# Patient Record
Sex: MALE | Race: WHITE | NOT HISPANIC OR LATINO | Employment: OTHER | URBAN - METROPOLITAN AREA
[De-identification: names, ages, dates, MRNs, and addresses within clinical notes are randomized per-mention and may not be internally consistent; named-entity substitution may affect disease eponyms.]

---

## 2019-08-20 ENCOUNTER — TRANSCRIBE ORDERS (OUTPATIENT)
Dept: LAB | Facility: MEDICAL CENTER | Age: 59
End: 2019-08-20

## 2019-08-20 ENCOUNTER — APPOINTMENT (OUTPATIENT)
Dept: LAB | Facility: MEDICAL CENTER | Age: 59
End: 2019-08-20
Payer: COMMERCIAL

## 2019-08-20 DIAGNOSIS — E78.5 HYPERLIPIDEMIA, UNSPECIFIED HYPERLIPIDEMIA TYPE: ICD-10-CM

## 2019-08-20 DIAGNOSIS — M25.50 ARTHRALGIA, UNSPECIFIED JOINT: ICD-10-CM

## 2019-08-20 DIAGNOSIS — Z12.5 SCREENING PSA (PROSTATE SPECIFIC ANTIGEN): ICD-10-CM

## 2019-08-20 DIAGNOSIS — M19.90 OSTEOARTHRITIS, UNSPECIFIED OSTEOARTHRITIS TYPE, UNSPECIFIED SITE: ICD-10-CM

## 2019-08-20 DIAGNOSIS — I10 HYPERTENSION, UNSPECIFIED TYPE: ICD-10-CM

## 2019-08-20 DIAGNOSIS — K22.719 BARRETT'S ESOPHAGUS WITH DYSPLASIA: Primary | ICD-10-CM

## 2019-08-20 DIAGNOSIS — K22.719 BARRETT'S ESOPHAGUS WITH DYSPLASIA: ICD-10-CM

## 2019-08-20 LAB
25(OH)D3 SERPL-MCNC: 20.4 NG/ML (ref 30–100)
ALBUMIN SERPL BCP-MCNC: 4.7 G/DL (ref 3.5–5)
ALP SERPL-CCNC: 59 U/L (ref 46–116)
ALT SERPL W P-5'-P-CCNC: 60 U/L (ref 12–78)
ANION GAP SERPL CALCULATED.3IONS-SCNC: 8 MMOL/L (ref 4–13)
AST SERPL W P-5'-P-CCNC: 31 U/L (ref 5–45)
BILIRUB SERPL-MCNC: 0.53 MG/DL (ref 0.2–1)
BUN SERPL-MCNC: 24 MG/DL (ref 5–25)
CALCIUM SERPL-MCNC: 9.4 MG/DL (ref 8.3–10.1)
CHLORIDE SERPL-SCNC: 105 MMOL/L (ref 100–108)
CHOLEST SERPL-MCNC: 234 MG/DL (ref 50–200)
CO2 SERPL-SCNC: 27 MMOL/L (ref 21–32)
CREAT SERPL-MCNC: 1.09 MG/DL (ref 0.6–1.3)
ERYTHROCYTE [DISTWIDTH] IN BLOOD BY AUTOMATED COUNT: 12.1 % (ref 11.6–15.1)
GFR SERPL CREATININE-BSD FRML MDRD: 74 ML/MIN/1.73SQ M
GLUCOSE SERPL-MCNC: 112 MG/DL (ref 65–140)
HCT VFR BLD AUTO: 45.3 % (ref 36.5–49.3)
HDLC SERPL-MCNC: 50 MG/DL (ref 40–60)
HGB BLD-MCNC: 15.6 G/DL (ref 12–17)
MCH RBC QN AUTO: 33.1 PG (ref 26.8–34.3)
MCHC RBC AUTO-ENTMCNC: 34.4 G/DL (ref 31.4–37.4)
MCV RBC AUTO: 96 FL (ref 82–98)
NONHDLC SERPL-MCNC: 184 MG/DL
PLATELET # BLD AUTO: 183 THOUSANDS/UL (ref 149–390)
PMV BLD AUTO: 11 FL (ref 8.9–12.7)
POTASSIUM SERPL-SCNC: 4.1 MMOL/L (ref 3.5–5.3)
PROT SERPL-MCNC: 7.9 G/DL (ref 6.4–8.2)
PSA SERPL-MCNC: 0.4 NG/ML (ref 0–4)
RBC # BLD AUTO: 4.72 MILLION/UL (ref 3.88–5.62)
SODIUM SERPL-SCNC: 140 MMOL/L (ref 136–145)
T4 FREE SERPL-MCNC: 0.75 NG/DL (ref 0.76–1.46)
TRIGL SERPL-MCNC: 448 MG/DL
TSH SERPL DL<=0.05 MIU/L-ACNC: 1.64 UIU/ML (ref 0.36–3.74)
WBC # BLD AUTO: 6.29 THOUSAND/UL (ref 4.31–10.16)

## 2019-08-20 PROCEDURE — 80053 COMPREHEN METABOLIC PANEL: CPT

## 2019-08-20 PROCEDURE — 84439 ASSAY OF FREE THYROXINE: CPT

## 2019-08-20 PROCEDURE — 85027 COMPLETE CBC AUTOMATED: CPT

## 2019-08-20 PROCEDURE — 84443 ASSAY THYROID STIM HORMONE: CPT

## 2019-08-20 PROCEDURE — 86618 LYME DISEASE ANTIBODY: CPT

## 2019-08-20 PROCEDURE — 80061 LIPID PANEL: CPT

## 2019-08-20 PROCEDURE — 82306 VITAMIN D 25 HYDROXY: CPT

## 2019-08-20 PROCEDURE — 36415 COLL VENOUS BLD VENIPUNCTURE: CPT

## 2019-08-20 PROCEDURE — G0103 PSA SCREENING: HCPCS

## 2019-08-22 LAB — B BURGDOR IGG+IGM SER-ACNC: <0.91 ISR (ref 0–0.9)

## 2020-07-14 ENCOUNTER — TRANSCRIBE ORDERS (OUTPATIENT)
Dept: LAB | Facility: HOSPITAL | Age: 60
End: 2020-07-14

## 2020-07-14 ENCOUNTER — APPOINTMENT (OUTPATIENT)
Dept: LAB | Facility: HOSPITAL | Age: 60
End: 2020-07-14
Attending: FAMILY MEDICINE
Payer: COMMERCIAL

## 2020-07-14 DIAGNOSIS — R20.2 PARESTHESIAS: ICD-10-CM

## 2020-07-14 DIAGNOSIS — R23.8 EASY BRUISING: ICD-10-CM

## 2020-07-14 DIAGNOSIS — M89.9 BONE DISEASE: ICD-10-CM

## 2020-07-14 DIAGNOSIS — E78.5 HYPERLIPIDEMIA, UNSPECIFIED HYPERLIPIDEMIA TYPE: ICD-10-CM

## 2020-07-14 DIAGNOSIS — I10 HYPERTENSION, UNSPECIFIED TYPE: ICD-10-CM

## 2020-07-14 DIAGNOSIS — I10 HYPERTENSION, UNSPECIFIED TYPE: Primary | ICD-10-CM

## 2020-07-14 LAB
25(OH)D3 SERPL-MCNC: 15.5 NG/ML (ref 30–100)
ALBUMIN SERPL BCP-MCNC: 4.5 G/DL (ref 3.5–5.7)
ALP SERPL-CCNC: 48 U/L (ref 40–150)
ALT SERPL W P-5'-P-CCNC: 89 U/L (ref 7–52)
ANION GAP SERPL CALCULATED.3IONS-SCNC: 12 MMOL/L (ref 4–13)
AST SERPL W P-5'-P-CCNC: 58 U/L (ref 13–39)
BASOPHILS # BLD AUTO: 0.1 THOUSANDS/ΜL (ref 0–0.1)
BASOPHILS NFR BLD AUTO: 2 % (ref 0–2)
BILIRUB SERPL-MCNC: 0.6 MG/DL (ref 0.2–1)
BUN SERPL-MCNC: 16 MG/DL (ref 7–25)
CALCIUM SERPL-MCNC: 9.4 MG/DL (ref 8.6–10.5)
CHLORIDE SERPL-SCNC: 104 MMOL/L (ref 98–107)
CHOLEST SERPL-MCNC: 220 MG/DL (ref 0–200)
CO2 SERPL-SCNC: 26 MMOL/L (ref 21–31)
CREAT SERPL-MCNC: 1 MG/DL (ref 0.7–1.3)
CRP SERPL QL: <5 MG/L
EOSINOPHIL # BLD AUTO: 0.2 THOUSAND/ΜL (ref 0–0.61)
EOSINOPHIL NFR BLD AUTO: 6 % (ref 0–5)
ERYTHROCYTE [DISTWIDTH] IN BLOOD BY AUTOMATED COUNT: 13.2 % (ref 11.5–14.5)
ERYTHROCYTE [SEDIMENTATION RATE] IN BLOOD: 6 MM/HOUR (ref 0–20)
FOLATE SERPL-MCNC: >20 NG/ML (ref 3.1–17.5)
GFR SERPL CREATININE-BSD FRML MDRD: 82 ML/MIN/1.73SQ M
GLUCOSE P FAST SERPL-MCNC: 123 MG/DL (ref 65–99)
HCT VFR BLD AUTO: 44.3 % (ref 42–47)
HDLC SERPL-MCNC: 53 MG/DL
HGB BLD-MCNC: 15.5 G/DL (ref 14–18)
INR PPP: 1.08 (ref 0.84–1.19)
LYMPHOCYTES # BLD AUTO: 1.6 THOUSANDS/ΜL (ref 0.6–4.47)
LYMPHOCYTES NFR BLD AUTO: 38 % (ref 21–51)
MCH RBC QN AUTO: 33.9 PG (ref 26–34)
MCHC RBC AUTO-ENTMCNC: 34.9 G/DL (ref 31–37)
MCV RBC AUTO: 97 FL (ref 81–99)
MONOCYTES # BLD AUTO: 0.4 THOUSAND/ΜL (ref 0.17–1.22)
MONOCYTES NFR BLD AUTO: 9 % (ref 2–12)
NEUTROPHILS # BLD AUTO: 2 THOUSANDS/ΜL (ref 1.4–6.5)
NEUTS SEG NFR BLD AUTO: 46 % (ref 42–75)
NONHDLC SERPL-MCNC: 167 MG/DL
PLATELET # BLD AUTO: 167 THOUSANDS/UL (ref 149–390)
PMV BLD AUTO: 8.8 FL (ref 8.6–11.7)
POTASSIUM SERPL-SCNC: 4.1 MMOL/L (ref 3.5–5.5)
PROT SERPL-MCNC: 7.3 G/DL (ref 6.4–8.9)
PROTHROMBIN TIME: 13.9 SECONDS (ref 11.6–14.5)
PSA SERPL-MCNC: 0.4 NG/ML (ref 0–4)
RBC # BLD AUTO: 4.57 MILLION/UL (ref 4.3–5.9)
SODIUM SERPL-SCNC: 142 MMOL/L (ref 134–143)
T4 FREE SERPL-MCNC: 0.85 NG/DL (ref 0.76–1.46)
TRIGL SERPL-MCNC: 482 MG/DL (ref 44–166)
TSH SERPL DL<=0.05 MIU/L-ACNC: 2.96 UIU/ML (ref 0.45–5.33)
VIT B12 SERPL-MCNC: 2894 PG/ML (ref 100–900)
WBC # BLD AUTO: 4.3 THOUSAND/UL (ref 4.8–10.8)

## 2020-07-14 PROCEDURE — 85652 RBC SED RATE AUTOMATED: CPT

## 2020-07-14 PROCEDURE — 85610 PROTHROMBIN TIME: CPT

## 2020-07-14 PROCEDURE — 84443 ASSAY THYROID STIM HORMONE: CPT

## 2020-07-14 PROCEDURE — 82746 ASSAY OF FOLIC ACID SERUM: CPT

## 2020-07-14 PROCEDURE — 80053 COMPREHEN METABOLIC PANEL: CPT

## 2020-07-14 PROCEDURE — 85025 COMPLETE CBC W/AUTO DIFF WBC: CPT

## 2020-07-14 PROCEDURE — 84439 ASSAY OF FREE THYROXINE: CPT

## 2020-07-14 PROCEDURE — 82306 VITAMIN D 25 HYDROXY: CPT

## 2020-07-14 PROCEDURE — 80061 LIPID PANEL: CPT

## 2020-07-14 PROCEDURE — 86140 C-REACTIVE PROTEIN: CPT

## 2020-07-14 PROCEDURE — 82607 VITAMIN B-12: CPT

## 2020-07-14 PROCEDURE — 84153 ASSAY OF PSA TOTAL: CPT

## 2020-07-14 PROCEDURE — 36415 COLL VENOUS BLD VENIPUNCTURE: CPT

## 2020-07-14 PROCEDURE — 86618 LYME DISEASE ANTIBODY: CPT

## 2020-07-15 LAB — B BURGDOR IGG+IGM SER-ACNC: <0.91 ISR (ref 0–0.9)

## 2021-04-15 ENCOUNTER — APPOINTMENT (OUTPATIENT)
Dept: LAB | Facility: MEDICAL CENTER | Age: 61
End: 2021-04-15
Payer: COMMERCIAL

## 2021-04-15 ENCOUNTER — TRANSCRIBE ORDERS (OUTPATIENT)
Dept: LAB | Facility: MEDICAL CENTER | Age: 61
End: 2021-04-15

## 2021-04-15 DIAGNOSIS — E78.5 HYPERLIPIDEMIA, UNSPECIFIED HYPERLIPIDEMIA TYPE: ICD-10-CM

## 2021-04-15 DIAGNOSIS — E74.39 GLUCOSE INTOLERANCE: ICD-10-CM

## 2021-04-15 DIAGNOSIS — I10 HYPERTENSION, UNSPECIFIED TYPE: ICD-10-CM

## 2021-04-15 DIAGNOSIS — M79.643 PAIN OF HAND, UNSPECIFIED LATERALITY: ICD-10-CM

## 2021-04-15 DIAGNOSIS — M79.643 PAIN OF HAND, UNSPECIFIED LATERALITY: Primary | ICD-10-CM

## 2021-04-15 DIAGNOSIS — R79.89 ELEVATED LFTS: ICD-10-CM

## 2021-04-15 DIAGNOSIS — E55.9 VITAMIN D DEFICIENCY: ICD-10-CM

## 2021-04-15 DIAGNOSIS — M79.673 PAIN OF FOOT, UNSPECIFIED LATERALITY: ICD-10-CM

## 2021-04-15 DIAGNOSIS — K22.719 BARRETT'S ESOPHAGUS WITH DYSPLASIA: ICD-10-CM

## 2021-04-15 LAB
ALBUMIN SERPL BCP-MCNC: 4.1 G/DL (ref 3.5–5)
ALP SERPL-CCNC: 63 U/L (ref 46–116)
ALT SERPL W P-5'-P-CCNC: 109 U/L (ref 12–78)
AST SERPL W P-5'-P-CCNC: 57 U/L (ref 5–45)
BILIRUB DIRECT SERPL-MCNC: 0.14 MG/DL (ref 0–0.2)
BILIRUB SERPL-MCNC: 0.79 MG/DL (ref 0.2–1)
CHOLEST SERPL-MCNC: 197 MG/DL (ref 50–200)
CRP SERPL QL: <3 MG/L
ERYTHROCYTE [SEDIMENTATION RATE] IN BLOOD: 4 MM/HOUR (ref 0–19)
EST. AVERAGE GLUCOSE BLD GHB EST-MCNC: 123 MG/DL
HBA1C MFR BLD: 5.9 %
HDLC SERPL-MCNC: 62 MG/DL
LDLC SERPL CALC-MCNC: 94 MG/DL (ref 0–100)
NONHDLC SERPL-MCNC: 135 MG/DL
PROT SERPL-MCNC: 7.5 G/DL (ref 6.4–8.2)
RHEUMATOID FACT SER QL LA: NEGATIVE
TRIGL SERPL-MCNC: 203 MG/DL

## 2021-04-15 PROCEDURE — 85652 RBC SED RATE AUTOMATED: CPT

## 2021-04-15 PROCEDURE — 86618 LYME DISEASE ANTIBODY: CPT

## 2021-04-15 PROCEDURE — 86430 RHEUMATOID FACTOR TEST QUAL: CPT

## 2021-04-15 PROCEDURE — 83036 HEMOGLOBIN GLYCOSYLATED A1C: CPT

## 2021-04-15 PROCEDURE — 36415 COLL VENOUS BLD VENIPUNCTURE: CPT

## 2021-04-15 PROCEDURE — 80076 HEPATIC FUNCTION PANEL: CPT

## 2021-04-15 PROCEDURE — 86038 ANTINUCLEAR ANTIBODIES: CPT

## 2021-04-15 PROCEDURE — 86140 C-REACTIVE PROTEIN: CPT

## 2021-04-15 PROCEDURE — 80061 LIPID PANEL: CPT

## 2021-04-16 LAB
B BURGDOR IGG+IGM SER-ACNC: 3
RYE IGE QN: NEGATIVE

## 2021-09-08 ENCOUNTER — APPOINTMENT (OUTPATIENT)
Dept: LAB | Facility: MEDICAL CENTER | Age: 61
End: 2021-09-08
Payer: COMMERCIAL

## 2021-09-08 DIAGNOSIS — E78.5 HYPERLIPIDEMIA, UNSPECIFIED HYPERLIPIDEMIA TYPE: ICD-10-CM

## 2021-09-08 DIAGNOSIS — R73.9 ELEVATED BLOOD SUGAR: ICD-10-CM

## 2021-09-08 DIAGNOSIS — R79.89 ABNORMAL LFTS: ICD-10-CM

## 2021-09-08 LAB
ALBUMIN SERPL BCP-MCNC: 3.7 G/DL (ref 3.5–5)
ALP SERPL-CCNC: 61 U/L (ref 46–116)
ALT SERPL W P-5'-P-CCNC: 85 U/L (ref 12–78)
AST SERPL W P-5'-P-CCNC: 45 U/L (ref 5–45)
BILIRUB DIRECT SERPL-MCNC: 0.11 MG/DL (ref 0–0.2)
BILIRUB SERPL-MCNC: 0.56 MG/DL (ref 0.2–1)
EST. AVERAGE GLUCOSE BLD GHB EST-MCNC: 117 MG/DL
HAV IGM SER QL: NORMAL
HBA1C MFR BLD: 5.7 %
HBV CORE IGM SER QL: NORMAL
HBV SURFACE AG SER QL: NORMAL
HCV AB SER QL: NORMAL
PROT SERPL-MCNC: 7.8 G/DL (ref 6.4–8.2)

## 2021-09-08 PROCEDURE — 83036 HEMOGLOBIN GLYCOSYLATED A1C: CPT

## 2021-09-08 PROCEDURE — 80076 HEPATIC FUNCTION PANEL: CPT

## 2021-09-08 PROCEDURE — 36415 COLL VENOUS BLD VENIPUNCTURE: CPT

## 2021-09-08 PROCEDURE — 80074 ACUTE HEPATITIS PANEL: CPT

## 2022-01-31 ENCOUNTER — HOSPITAL ENCOUNTER (EMERGENCY)
Facility: HOSPITAL | Age: 62
Discharge: HOME | End: 2022-01-31
Attending: EMERGENCY MEDICINE | Admitting: EMERGENCY MEDICINE
Payer: COMMERCIAL

## 2022-01-31 ENCOUNTER — APPOINTMENT (EMERGENCY)
Dept: NON INVASIVE DIAGNOSTICS | Facility: HOSPITAL | Age: 62
End: 2022-01-31
Payer: COMMERCIAL

## 2022-01-31 VITALS
RESPIRATION RATE: 22 BRPM | OXYGEN SATURATION: 95 % | DIASTOLIC BLOOD PRESSURE: 91 MMHG | HEART RATE: 65 BPM | TEMPERATURE: 97.8 F | SYSTOLIC BLOOD PRESSURE: 151 MMHG | WEIGHT: 181.44 LBS

## 2022-01-31 DIAGNOSIS — R03.0 ELEVATED BLOOD PRESSURE READING: ICD-10-CM

## 2022-01-31 DIAGNOSIS — M79.662 PAIN OF LEFT CALF: ICD-10-CM

## 2022-01-31 DIAGNOSIS — I10 HYPERTENSION: Primary | ICD-10-CM

## 2022-01-31 PROBLEM — K21.9 GASTROESOPHAGEAL REFLUX DISEASE: Status: ACTIVE | Noted: 2022-01-31

## 2022-01-31 PROBLEM — E78.5 HYPERLIPIDEMIA: Status: ACTIVE | Noted: 2022-01-31

## 2022-01-31 PROBLEM — M72.2 PLANTAR FASCIITIS: Status: ACTIVE | Noted: 2022-01-31

## 2022-01-31 PROBLEM — E55.9 VITAMIN D DEFICIENCY: Status: ACTIVE | Noted: 2022-01-31

## 2022-01-31 PROBLEM — H91.8X9 OTHER SPECIFIED FORMS OF HEARING LOSS: Status: ACTIVE | Noted: 2022-01-31

## 2022-01-31 PROBLEM — G47.30 SLEEP APNEA: Status: ACTIVE | Noted: 2022-01-31

## 2022-01-31 PROBLEM — H61.20 IMPACTED CERUMEN: Status: ACTIVE | Noted: 2022-01-31

## 2022-01-31 LAB
ALBUMIN SERPL BCP-MCNC: 4 G/DL (ref 3.5–5)
ALP SERPL-CCNC: 63 U/L (ref 46–116)
ALT SERPL W P-5'-P-CCNC: 101 U/L (ref 12–78)
ANION GAP SERPL CALCULATED.3IONS-SCNC: 10 MMOL/L (ref 4–13)
AST SERPL W P-5'-P-CCNC: 45 U/L (ref 5–45)
BASOPHILS # BLD AUTO: 0.06 THOUSANDS/ΜL (ref 0–0.1)
BASOPHILS NFR BLD AUTO: 1 % (ref 0–1)
BILIRUB SERPL-MCNC: 0.36 MG/DL (ref 0.2–1)
BUN SERPL-MCNC: 16 MG/DL (ref 5–25)
CALCIUM SERPL-MCNC: 8.5 MG/DL (ref 8.3–10.1)
CARDIAC TROPONIN I PNL SERPL HS: 4 NG/L
CHLORIDE SERPL-SCNC: 99 MMOL/L (ref 100–108)
CK MB SERPL-MCNC: 1.2 NG/ML (ref 0–5)
CK MB SERPL-MCNC: <1 % (ref 0–2.5)
CK SERPL-CCNC: 218 U/L (ref 39–308)
CO2 SERPL-SCNC: 27 MMOL/L (ref 21–32)
CREAT SERPL-MCNC: 0.96 MG/DL (ref 0.6–1.3)
EOSINOPHIL # BLD AUTO: 0.2 THOUSAND/ΜL (ref 0–0.61)
EOSINOPHIL NFR BLD AUTO: 3 % (ref 0–6)
ERYTHROCYTE [DISTWIDTH] IN BLOOD BY AUTOMATED COUNT: 12.3 % (ref 11.6–15.1)
GFR SERPL CREATININE-BSD FRML MDRD: 84 ML/MIN/1.73SQ M
GLUCOSE SERPL-MCNC: 119 MG/DL (ref 65–140)
HCT VFR BLD AUTO: 42.6 % (ref 36.5–49.3)
HGB BLD-MCNC: 15.1 G/DL (ref 12–17)
IMM GRANULOCYTES # BLD AUTO: 0.02 THOUSAND/UL (ref 0–0.2)
IMM GRANULOCYTES NFR BLD AUTO: 0 % (ref 0–2)
LYMPHOCYTES # BLD AUTO: 1.92 THOUSANDS/ΜL (ref 0.6–4.47)
LYMPHOCYTES NFR BLD AUTO: 32 % (ref 14–44)
MAGNESIUM SERPL-MCNC: 2.1 MG/DL (ref 1.6–2.6)
MCH RBC QN AUTO: 33.4 PG (ref 26.8–34.3)
MCHC RBC AUTO-ENTMCNC: 35.4 G/DL (ref 31.4–37.4)
MCV RBC AUTO: 94 FL (ref 82–98)
MONOCYTES # BLD AUTO: 0.5 THOUSAND/ΜL (ref 0.17–1.22)
MONOCYTES NFR BLD AUTO: 8 % (ref 4–12)
NEUTROPHILS # BLD AUTO: 3.32 THOUSANDS/ΜL (ref 1.85–7.62)
NEUTS SEG NFR BLD AUTO: 56 % (ref 43–75)
NRBC BLD AUTO-RTO: 0 /100 WBCS
PLATELET # BLD AUTO: 179 THOUSANDS/UL (ref 149–390)
PMV BLD AUTO: 10 FL (ref 8.9–12.7)
POTASSIUM SERPL-SCNC: 4 MMOL/L (ref 3.5–5.3)
PROT SERPL-MCNC: 7.7 G/DL (ref 6.4–8.2)
RBC # BLD AUTO: 4.52 MILLION/UL (ref 3.88–5.62)
SODIUM SERPL-SCNC: 136 MMOL/L (ref 136–145)
TSH SERPL DL<=0.05 MIU/L-ACNC: 1.31 UIU/ML (ref 0.36–3.74)
WBC # BLD AUTO: 6.02 THOUSAND/UL (ref 4.31–10.16)

## 2022-01-31 PROCEDURE — 84484 ASSAY OF TROPONIN QUANT: CPT | Performed by: PHYSICIAN ASSISTANT

## 2022-01-31 PROCEDURE — 82553 CREATINE MB FRACTION: CPT | Performed by: PHYSICIAN ASSISTANT

## 2022-01-31 PROCEDURE — 84443 ASSAY THYROID STIM HORMONE: CPT | Performed by: PHYSICIAN ASSISTANT

## 2022-01-31 PROCEDURE — 99284 EMERGENCY DEPT VISIT MOD MDM: CPT

## 2022-01-31 PROCEDURE — 80053 COMPREHEN METABOLIC PANEL: CPT | Performed by: PHYSICIAN ASSISTANT

## 2022-01-31 PROCEDURE — 83735 ASSAY OF MAGNESIUM: CPT | Performed by: PHYSICIAN ASSISTANT

## 2022-01-31 PROCEDURE — 82550 ASSAY OF CK (CPK): CPT | Performed by: PHYSICIAN ASSISTANT

## 2022-01-31 PROCEDURE — 93005 ELECTROCARDIOGRAM TRACING: CPT

## 2022-01-31 PROCEDURE — 93971 EXTREMITY STUDY: CPT

## 2022-01-31 PROCEDURE — 93971 EXTREMITY STUDY: CPT | Performed by: SURGERY

## 2022-01-31 PROCEDURE — 36415 COLL VENOUS BLD VENIPUNCTURE: CPT | Performed by: PHYSICIAN ASSISTANT

## 2022-01-31 PROCEDURE — 99285 EMERGENCY DEPT VISIT HI MDM: CPT | Performed by: PHYSICIAN ASSISTANT

## 2022-01-31 PROCEDURE — 85025 COMPLETE CBC W/AUTO DIFF WBC: CPT | Performed by: PHYSICIAN ASSISTANT

## 2022-01-31 RX ORDER — ASPIRIN 81 MG/1
81 TABLET, CHEWABLE ORAL DAILY
COMMUNITY

## 2022-01-31 RX ORDER — CLONIDINE HYDROCHLORIDE 0.1 MG/1
0.2 TABLET ORAL ONCE
Status: DISCONTINUED | OUTPATIENT
Start: 2022-01-31 | End: 2022-01-31

## 2022-01-31 RX ORDER — FAMOTIDINE 40 MG/1
40 TABLET, FILM COATED ORAL DAILY
COMMUNITY

## 2022-01-31 RX ORDER — VALSARTAN 80 MG/1
80 TABLET ORAL DAILY
COMMUNITY

## 2022-01-31 NOTE — ED PROVIDER NOTES
History  Chief Complaint   Patient presents with    Hypertension     pts blood pressure elevated this am and having pain in back of left calf for 2 days     65year old male with PMH HTN, HLD presents ambulatory from spouse for evaluation of high blood pressure  Pt has longstanding history of hypertension  He takes valsartan 80 mg daily for his blood pressure  He reports compliance with his medication  This morning he notes his blood pressure was elevated  Denies any recent changes in his health or medications  Denies dizziness, lightheadedness, headache, chest pain, SOB, N/V/D, abdominal pain  Only complaint is pain in left calf which is new  Denies injury or trauma  Denies personal h/o DVT or PE  No reported aggravating or alleviating factors  No specific treatments tried  His PCP is Dr Zamora  at [address]       History provided by:  Patient and medical records   used: No    Hypertension  Chronicity:  Chronic  Notable PTA blood pressures:  Systolically in 629P at home  Context: not caffeine, not drug abuse, not medication change, not noncompliance and not OTC medications used    Relieved by:  None tried  Ineffective treatments:  Angiotensin blockers  Associated symptoms: no abdominal pain, no anxiety, no blurred vision, no chest pain, no confusion, no dizziness, no fatigue, no fever, no headaches, no hematuria, no loss of consciousness, no nausea, no neck pain, no palpitations, no peripheral edema, no shortness of breath, no syncope, not vomiting and no weakness    Risk factors: no cardiac disease, no decongestant use, no diabetes, no kidney disease, no prior stroke and no tobacco use        Prior to Admission Medications   Prescriptions Last Dose Informant Patient Reported? Taking?    L-Methylfolate-B6-B12 (FOLTX PO)   Yes Yes   Sig: Take 1 tablet by mouth in the morning   aspirin 81 mg chewable tablet   Yes Yes   Sig: Chew 81 mg daily   famotidine (PEPCID) 40 MG tablet Yes Yes   Sig: Take 40 mg by mouth daily   valsartan (DIOVAN) 80 mg tablet   Yes Yes   Sig: Take 80 mg by mouth daily      Facility-Administered Medications: None       Past Medical History:   Diagnosis Date    High cholesterol     Hypertension        History reviewed  No pertinent surgical history  History reviewed  No pertinent family history  I have reviewed and agree with the history as documented  E-Cigarette/Vaping    E-Cigarette Use Never User      E-Cigarette/Vaping Substances     Social History     Tobacco Use    Smoking status: Never Smoker    Smokeless tobacco: Never Used   Vaping Use    Vaping Use: Never used   Substance Use Topics    Alcohol use: Yes     Comment: social    Drug use: Never       Review of Systems   Constitutional: Negative  Negative for chills, fatigue and fever  HENT: Negative  Negative for congestion, rhinorrhea and sore throat  Eyes: Negative  Negative for blurred vision and visual disturbance  Respiratory: Negative  Negative for cough, shortness of breath and wheezing  Cardiovascular: Negative  Negative for chest pain, palpitations, leg swelling and syncope  Gastrointestinal: Negative  Negative for abdominal pain, constipation, diarrhea, nausea and vomiting  Genitourinary: Negative  Negative for dysuria, flank pain, frequency and hematuria  Musculoskeletal: Positive for arthralgias and myalgias  Negative for back pain and neck pain  Skin: Negative  Negative for rash  Neurological: Negative  Negative for dizziness, loss of consciousness, weakness, light-headedness, numbness and headaches  Psychiatric/Behavioral: Negative  Negative for confusion  The patient is not nervous/anxious  All other systems reviewed and are negative  Physical Exam  Physical Exam  Vitals and nursing note reviewed  Constitutional:       General: He is not in acute distress  Appearance: Normal appearance  He is well-developed   He is not toxic-appearing or diaphoretic  HENT:      Head: Normocephalic and atraumatic  Right Ear: Hearing, tympanic membrane, ear canal and external ear normal       Left Ear: Hearing, tympanic membrane, ear canal and external ear normal       Nose: Nose normal       Mouth/Throat:      Mouth: Mucous membranes are moist       Tongue: Tongue does not deviate from midline  Pharynx: Oropharynx is clear  Uvula midline  Eyes:      General: Lids are normal  No scleral icterus  Extraocular Movements: Extraocular movements intact  Conjunctiva/sclera: Conjunctivae normal       Pupils: Pupils are equal, round, and reactive to light  Neck:      Trachea: Trachea and phonation normal  No tracheal deviation  Cardiovascular:      Rate and Rhythm: Normal rate and regular rhythm  Pulses: Normal pulses  Radial pulses are 2+ on the right side and 2+ on the left side  Dorsalis pedis pulses are 2+ on the right side and 2+ on the left side  Posterior tibial pulses are 2+ on the right side and 2+ on the left side  Heart sounds: Normal heart sounds, S1 normal and S2 normal  No murmur heard  Pulmonary:      Effort: Pulmonary effort is normal  No tachypnea or respiratory distress  Breath sounds: Normal breath sounds  No wheezing, rhonchi or rales  Abdominal:      General: Bowel sounds are normal  There is no distension  Palpations: Abdomen is soft  Tenderness: There is no abdominal tenderness  There is no guarding or rebound  Musculoskeletal:      Cervical back: Normal range of motion and neck supple  Right lower leg: No edema  Left lower leg: Tenderness present  No deformity or lacerations  No edema  Legs:    Skin:     General: Skin is warm and dry  Capillary Refill: Capillary refill takes less than 2 seconds  Findings: No abrasion, bruising, erythema, rash or wound  Neurological:      General: No focal deficit present        Mental Status: He is alert and oriented to person, place, and time  GCS: GCS eye subscore is 4  GCS verbal subscore is 5  GCS motor subscore is 6  Cranial Nerves: No cranial nerve deficit  Sensory: No sensory deficit  Motor: No abnormal muscle tone  Gait: Gait normal       Comments: Pt ambulated into department unassisted     Psychiatric:         Mood and Affect: Mood normal          Speech: Speech normal          Behavior: Behavior normal          Vital Signs  ED Triage Vitals [01/31/22 1049]   Temperature Pulse Respirations Blood Pressure SpO2   97 8 °F (36 6 °C) 81 18 (!) 192/99 96 %      Temp Source Heart Rate Source Patient Position - Orthostatic VS BP Location FiO2 (%)   Temporal Monitor Sitting Right arm --      Pain Score       9           Vitals:    01/31/22 1130 01/31/22 1200 01/31/22 1230 01/31/22 1300   BP: (!) 163/119 149/85 158/90 151/91   Pulse: 75 69 70 65   Patient Position - Orthostatic VS: Lying Lying Lying Lying         Visual Acuity      ED Medications  Medications - No data to display    Diagnostic Studies  Results Reviewed     Procedure Component Value Units Date/Time    CKMB [[id number]]  (Normal) Collected: 01/31/22 1113    Lab Status: Final result Specimen: Blood from Arm, Left Updated: 01/31/22 1253     CK-MB Index <1 0 %      CK-MB 1 2 ng/mL     Comprehensive metabolic panel [[id number]]  (Abnormal) Collected: 01/31/22 1113    Lab Status: Final result Specimen: Blood from Arm, Left Updated: 01/31/22 1232     Sodium 136 mmol/L      Potassium 4 0 mmol/L      Chloride 99 mmol/L      CO2 27 mmol/L      ANION GAP 10 mmol/L      BUN 16 mg/dL      Creatinine 0 96 mg/dL      Glucose 119 mg/dL      Calcium 8 5 mg/dL      AST 45 U/L       U/L      Alkaline Phosphatase 63 U/L      Total Protein 7 7 g/dL      Albumin 4 0 g/dL      Total Bilirubin 0 36 mg/dL      eGFR 84 ml/min/1 73sq m     Narrative:      [place] guidelines for Chronic Kidney Disease (CKD):     Stage 1 with normal or high GFR (GFR > 90 mL/min/1 73 square meters)    Stage 2 Mild CKD (GFR = 60-89 mL/min/1 73 square meters)    Stage 3A Moderate CKD (GFR = 45-59 mL/min/1 73 square meters)    Stage 3B Moderate CKD (GFR = 30-44 mL/min/1 73 square meters)    Stage 4 Severe CKD (GFR = 15-29 mL/min/1 73 square meters)    Stage 5 End Stage CKD (GFR <15 mL/min/1 73 square meters)  Note: GFR calculation is accurate only with a steady state creatinine    Magnesium [[id number]]  (Normal) Collected: 01/31/22 1113    Lab Status: Final result Specimen: Blood from Arm, Left Updated: 01/31/22 1201     Magnesium 2 1 mg/dL     TSH [[id number]]  (Normal) Collected: 01/31/22 1113    Lab Status: Final result Specimen: Blood from Arm, Left Updated: 01/31/22 1201     TSH 3RD GENERATON 1 315 uIU/mL     Narrative:      Patients undergoing fluorescein dye angiography may retain small amounts of fluorescein in the body for 48-72 hours post procedure  Samples containing fluorescein can produce falsely depressed TSH values  If the patient had this procedure,a specimen should be resubmitted post fluorescein clearance        CK Total with Reflex CKMB [[id number]]  (Normal) Collected: 01/31/22 1113    Lab Status: Final result Specimen: Blood from Arm, Left Updated: 01/31/22 1200     Total  U/L     HS Troponin 0hr (reflex protocol) [[id number]]  (Normal) Collected: 01/31/22 1113    Lab Status: Final result Specimen: Blood from Arm, Left Updated: 01/31/22 1147     hs TnI 0hr 4 ng/L     CBC and differential [[id number]] Collected: 01/31/22 1113    Lab Status: Final result Specimen: Blood from Arm, Left Updated: 01/31/22 1121     WBC 6 02 Thousand/uL      RBC 4 52 Million/uL      Hemoglobin 15 1 g/dL      Hematocrit 42 6 %      MCV 94 fL      MCH 33 4 pg      MCHC 35 4 g/dL      RDW 12 3 %      MPV 10 0 fL      Platelets 844 Thousands/uL      nRBC 0 /100 WBCs      Neutrophils Relative 56 %      Immat GRANS % 0 %      Lymphocytes Relative 32 % Monocytes Relative 8 %      Eosinophils Relative 3 %      Basophils Relative 1 %      Neutrophils Absolute 3 32 Thousands/µL      Immature Grans Absolute 0 02 Thousand/uL      Lymphocytes Absolute 1 92 Thousands/µL      Monocytes Absolute 0 50 Thousand/µL      Eosinophils Absolute 0 20 Thousand/µL      Basophils Absolute 0 06 Thousands/µL                  VAS lower limb venous duplex study, unilateral/limited   Final Result by Dante Ho DO (01/31 1313)                 Procedures  ECG 12 Lead Documentation Only    Date/Time: 1/31/2022 10:59 AM  Performed by: Tanvir Ibrahim PA-C  Authorized by: Tanvir Ibrahim PA-C     Indications / Diagnosis:  HTN  ECG reviewed by me, the ED Provider: yes    Patient location:  ED  Previous ECG:     Previous ECG:  Unavailable    Comparison to cardiac monitor: Yes    Interpretation:     Interpretation: abnormal    Rate:     ECG rate:  77    ECG rate assessment: normal    Rhythm:     Rhythm: sinus rhythm    Ectopy:     Ectopy: none    QRS:     QRS axis:  Normal    QRS intervals:  Normal  Conduction:     Conduction: abnormal      Abnormal conduction: complete RBBB    ST segments:     ST segments:  Normal  T waves:     T waves: normal    Comments:      , , QT/QTc 414/468; no acute ischemic changes  No prior EKG for comparison  ED Course  ED Course as of 01/31/22 1611   Mon Jan 31, 2022   1051 Blood Pressure(!): 192/99   1123 WBC: 6 02   1123 Hemoglobin: 15 1   1124 Platelet Count: 355   0168 Prelim from vascular tech Naida Virgen, pt negative for DVT in LLE   1152 hs TnI 0hr: 4   1210 Total CK: 218   1210 Magnesium: 2 1   1210 TSH 3RD GENERATON: 1 315   1225 Pt and spouse updated on all results  BP improved without specific intervention  Discussed adding another agent such as amlodipine to his current meds  However given a day of isolated elevated BP readings, could also continue to monitor and f/u with PCP    Pt agrees with this plan of care and will follow up with Dr Chelsie Tobar  1242 Glucose, Random: 119   1243 Creatinine: 0 96   1243 BUN: 16   1243 Sodium: 136   1243 Potassium: 4 0   1243 Chloride(!): 99   1243 CO2: 27   1243 Anion Gap: 10   1243 Calcium: 8 5   1243 AST: 45   1243 ALT(!): 101   1243 Alkaline Phosphatase: 63   1243 Total Protein: 7 7   1243 Albumin: 4 0   1243 TOTAL BILIRUBIN: 0 36   1243 eGFR: 84   1256 CREATINE KINASE-MB INDEX: <1 0   1256 CREATINE KINASE-MB FRACTION: 1 2     Advised to continue home meds as prescribed  Instructed to monitor blood pressure  No sign of end organ damage  Did not recommend additional BP med at this time unless BP would be continued to be elevated  Discussed continued symptomatic/supportive care  Advised rest, fluids, OTC meds as needed for symptoms  RICE therapy  Probable calf strain  Strict return precautions outlined  Advised outpatient follow up with PCP or return to ER for change in condition as outlined  Pt verbalized understanding and had no further questions  HEART Risk Score      Most Recent Value   Heart Score Risk Calculator    History 0 Filed at: 01/31/2022 1112   ECG 1 Filed at: 01/31/2022 1112   Age 1 Filed at: 01/31/2022 1112   Risk Factors 1 Filed at: 01/31/2022 1112   Troponin 0 Filed at: 01/31/2022 1112   HEART Score 3 Filed at: 01/31/2022 1112                        SBIRT 20yo+      Most Recent Value   SBIRT (25 yo +)    In order to provide better care to our patients, we are screening all of our patients for alcohol and drug use  Would it be okay to ask you these screening questions? Yes Filed at: 01/31/2022 1133   Initial Alcohol Screen: US AUDIT-C     1  How often do you have a drink containing alcohol? 0 Filed at: 01/31/2022 1133   2  How many drinks containing alcohol do you have on a typical day you are drinking? 0 Filed at: 01/31/2022 1133   3a  Male UNDER 65: How often do you have five or more drinks on one occasion?  0 Filed at: 01/31/2022 1133   Audit-C Score 0 Filed at: 01/31/2022 1133   RODRIGO: How many times in the past year have you    Used an illegal drug or used a prescription medication for non-medical reasons? Never Filed at: 01/31/2022 1133                    MDM  Number of Diagnoses or Management Options  Hypertension: established and worsening  Pain of left calf: new and requires workup     Amount and/or Complexity of Data Reviewed  Clinical lab tests: ordered and reviewed  Tests in the radiology section of CPT®: ordered and reviewed  Decide to obtain previous medical records or to obtain history from someone other than the patient: yes  Obtain history from someone other than the patient: yes  Review and summarize past medical records: yes  Independent visualization of images, tracings, or specimens: yes    Patient Progress  Patient progress: improved      Disposition  Final diagnoses:   Hypertension   Elevated blood pressure reading   Pain of left calf     Time reflects when diagnosis was documented in both MDM as applicable and the Disposition within this note     Time User Action Codes Description Comment    1/31/2022 12:46 PM Isma Bogus Add [I10] Hypertension     1/31/2022 12:46 PM Isma Bogus Add [R03 0] Elevated blood pressure reading     1/31/2022 12:46 PM Isma Bogus Add [N56 631] Pain of left calf       ED Disposition     ED Disposition Condition Date/Time Comment    Discharge Stable Mon Jan 31, 2022 12:46 PM Jean Alcaraz discharge to home/self care              Follow-up Information     Follow up With Specialties Details Why Contact Info Additional Information    Johnathon Adams MD Family Medicine Schedule an appointment as soon as possible for a visit   Luisito Dahl [address] [place] [address] Emergency Department Emergency Medicine  As needed [facility] [phone]  [address] [place] [address] Emergency Department[address], [address]          Discharge Medication List as of 1/31/2022 12:53 PM      CONTINUE these medications which have NOT CHANGED    Details   aspirin 81 mg chewable tablet Chew 81 mg daily, Historical Med      famotidine (PEPCID) 40 MG tablet Take 40 mg by mouth daily, Historical Med      L-Methylfolate-B6-B12 (FOLTX PO) Take 1 tablet by mouth in the morning, Historical Med      valsartan (DIOVAN) 80 mg tablet Take 80 mg by mouth daily, Historical Med             No discharge procedures on file      PDMP Review     None          ED Provider  Electronically Signed by           Tashia Schafer PA-C  01/31/22 7464

## 2022-01-31 NOTE — DISCHARGE INSTRUCTIONS
Continue home medications as prescribed  Continue to monitor your blood pressure  If you blood pressure continues to remain elevated, additional medications may be required  Rest, ice, compression, elevation  OTC tylenol and ibuprofen as needed for pain relief  Follow up with your PCP or return to ER as needed

## 2022-02-01 LAB
ATRIAL RATE: 77 BPM
P AXIS: 39 DEGREES
PR INTERVAL: 128 MS
QRS AXIS: 60 DEGREES
QRSD INTERVAL: 130 MS
QT INTERVAL: 414 MS
QTC INTERVAL: 468 MS
T WAVE AXIS: 31 DEGREES
VENTRICULAR RATE: 77 BPM

## 2022-02-01 PROCEDURE — 93010 ELECTROCARDIOGRAM REPORT: CPT | Performed by: INTERNAL MEDICINE

## 2022-02-11 ENCOUNTER — APPOINTMENT (OUTPATIENT)
Dept: LAB | Facility: MEDICAL CENTER | Age: 62
End: 2022-02-11
Payer: COMMERCIAL

## 2022-02-11 DIAGNOSIS — R73.03 PREDIABETES: ICD-10-CM

## 2022-02-11 DIAGNOSIS — Z09 HOSPITAL DISCHARGE FOLLOW-UP: ICD-10-CM

## 2022-02-11 DIAGNOSIS — E78.5 HYPERLIPIDEMIA, UNSPECIFIED HYPERLIPIDEMIA TYPE: ICD-10-CM

## 2022-02-11 DIAGNOSIS — I10 HYPERTENSION, UNSPECIFIED TYPE: ICD-10-CM

## 2022-02-11 DIAGNOSIS — M79.662 PAIN OF LEFT CALF: ICD-10-CM

## 2022-02-11 DIAGNOSIS — R06.83 SNORING: ICD-10-CM

## 2022-02-11 DIAGNOSIS — K21.9 GASTROESOPHAGEAL REFLUX DISEASE, UNSPECIFIED WHETHER ESOPHAGITIS PRESENT: ICD-10-CM

## 2022-02-11 DIAGNOSIS — E55.9 VITAMIN D DEFICIENCY: ICD-10-CM

## 2022-02-11 DIAGNOSIS — R79.89 ELEVATED LFTS: ICD-10-CM

## 2022-02-11 DIAGNOSIS — Z12.5 SCREENING PSA (PROSTATE SPECIFIC ANTIGEN): ICD-10-CM

## 2022-02-11 LAB
25(OH)D3 SERPL-MCNC: 12.7 NG/ML (ref 30–100)
ALBUMIN SERPL BCP-MCNC: 4.3 G/DL (ref 3.5–5)
ALP SERPL-CCNC: 52 U/L (ref 46–116)
ALT SERPL W P-5'-P-CCNC: 93 U/L (ref 12–78)
AST SERPL W P-5'-P-CCNC: 48 U/L (ref 5–45)
BILIRUB DIRECT SERPL-MCNC: 0.1 MG/DL (ref 0–0.2)
BILIRUB SERPL-MCNC: 0.54 MG/DL (ref 0.2–1)
CHOLEST SERPL-MCNC: 315 MG/DL
EST. AVERAGE GLUCOSE BLD GHB EST-MCNC: 123 MG/DL
GGT SERPL-CCNC: 262 U/L (ref 5–85)
HAV IGM SER QL: NORMAL
HBA1C MFR BLD: 5.9 %
HBV CORE IGM SER QL: NORMAL
HBV SURFACE AG SER QL: NORMAL
HCV AB SER QL: NORMAL
HDLC SERPL-MCNC: 50 MG/DL
LDLC SERPL CALC-MCNC: 186 MG/DL (ref 0–100)
NONHDLC SERPL-MCNC: 265 MG/DL
PROT SERPL-MCNC: 8.2 G/DL (ref 6.4–8.2)
PSA SERPL-MCNC: 0.5 NG/ML (ref 0–4)
T4 FREE SERPL-MCNC: 0.92 NG/DL (ref 0.76–1.46)
TRIGL SERPL-MCNC: 396 MG/DL
TSH SERPL DL<=0.05 MIU/L-ACNC: 1.59 UIU/ML (ref 0.36–3.74)

## 2022-02-11 PROCEDURE — 80061 LIPID PANEL: CPT

## 2022-02-11 PROCEDURE — 36415 COLL VENOUS BLD VENIPUNCTURE: CPT

## 2022-02-11 PROCEDURE — 83036 HEMOGLOBIN GLYCOSYLATED A1C: CPT

## 2022-02-11 PROCEDURE — 82306 VITAMIN D 25 HYDROXY: CPT

## 2022-02-11 PROCEDURE — G0103 PSA SCREENING: HCPCS

## 2022-02-11 PROCEDURE — 80076 HEPATIC FUNCTION PANEL: CPT

## 2022-02-11 PROCEDURE — 84443 ASSAY THYROID STIM HORMONE: CPT

## 2022-02-11 PROCEDURE — 80074 ACUTE HEPATITIS PANEL: CPT

## 2022-02-11 PROCEDURE — 84439 ASSAY OF FREE THYROXINE: CPT

## 2022-02-11 PROCEDURE — 82977 ASSAY OF GGT: CPT

## 2022-02-21 ENCOUNTER — HOSPITAL ENCOUNTER (OUTPATIENT)
Dept: ULTRASOUND IMAGING | Facility: HOSPITAL | Age: 62
Discharge: HOME | End: 2022-02-21
Attending: FAMILY MEDICINE
Payer: COMMERCIAL

## 2022-02-21 DIAGNOSIS — R79.89 ELEVATED LFTS: ICD-10-CM

## 2022-02-21 PROCEDURE — 76705 ECHO EXAM OF ABDOMEN: CPT

## 2022-10-12 PROBLEM — H61.20 IMPACTED CERUMEN: Status: RESOLVED | Noted: 2022-01-31 | Resolved: 2022-10-12

## 2023-03-15 ENCOUNTER — APPOINTMENT (OUTPATIENT)
Dept: LAB | Facility: MEDICAL CENTER | Age: 63
End: 2023-03-15

## 2023-03-15 DIAGNOSIS — R79.89 ABNORMAL LFTS: ICD-10-CM

## 2023-03-15 DIAGNOSIS — I10 HYPERTENSION, UNSPECIFIED TYPE: ICD-10-CM

## 2023-03-15 DIAGNOSIS — E55.9 VITAMIN D DEFICIENCY: ICD-10-CM

## 2023-03-15 DIAGNOSIS — E78.5 HYPERLIPIDEMIA, UNSPECIFIED HYPERLIPIDEMIA TYPE: ICD-10-CM

## 2023-03-15 DIAGNOSIS — Z12.5 PROSTATE CANCER SCREENING: ICD-10-CM

## 2023-03-15 DIAGNOSIS — R73.9 ELEVATED BLOOD SUGAR: ICD-10-CM

## 2023-03-15 LAB
25(OH)D3 SERPL-MCNC: 21.3 NG/ML (ref 30–100)
ALBUMIN SERPL BCP-MCNC: 4.3 G/DL (ref 3.5–5)
ALP SERPL-CCNC: 79 U/L (ref 46–116)
ALT SERPL W P-5'-P-CCNC: 139 U/L (ref 12–78)
ANION GAP SERPL CALCULATED.3IONS-SCNC: 5 MMOL/L (ref 4–13)
AST SERPL W P-5'-P-CCNC: 91 U/L (ref 5–45)
BASOPHILS # BLD AUTO: 0.07 THOUSANDS/ÂΜL (ref 0–0.1)
BASOPHILS NFR BLD AUTO: 1 % (ref 0–1)
BILIRUB SERPL-MCNC: 0.8 MG/DL (ref 0.2–1)
BUN SERPL-MCNC: 15 MG/DL (ref 5–25)
CALCIUM SERPL-MCNC: 9.7 MG/DL (ref 8.3–10.1)
CHLORIDE SERPL-SCNC: 104 MMOL/L (ref 96–108)
CHOLEST SERPL-MCNC: 244 MG/DL
CO2 SERPL-SCNC: 28 MMOL/L (ref 21–32)
CREAT SERPL-MCNC: 0.85 MG/DL (ref 0.6–1.3)
EOSINOPHIL # BLD AUTO: 0.2 THOUSAND/ÂΜL (ref 0–0.61)
EOSINOPHIL NFR BLD AUTO: 4 % (ref 0–6)
ERYTHROCYTE [DISTWIDTH] IN BLOOD BY AUTOMATED COUNT: 12.4 % (ref 11.6–15.1)
EST. AVERAGE GLUCOSE BLD GHB EST-MCNC: 131 MG/DL
GFR SERPL CREATININE-BSD FRML MDRD: 93 ML/MIN/1.73SQ M
GLUCOSE P FAST SERPL-MCNC: 149 MG/DL (ref 65–99)
HBA1C MFR BLD: 6.2 %
HCT VFR BLD AUTO: 45.6 % (ref 36.5–49.3)
HDLC SERPL-MCNC: 61 MG/DL
HGB BLD-MCNC: 15.8 G/DL (ref 12–17)
IMM GRANULOCYTES # BLD AUTO: 0.01 THOUSAND/UL (ref 0–0.2)
IMM GRANULOCYTES NFR BLD AUTO: 0 % (ref 0–2)
LYMPHOCYTES # BLD AUTO: 1.52 THOUSANDS/ÂΜL (ref 0.6–4.47)
LYMPHOCYTES NFR BLD AUTO: 31 % (ref 14–44)
MCH RBC QN AUTO: 33 PG (ref 26.8–34.3)
MCHC RBC AUTO-ENTMCNC: 34.6 G/DL (ref 31.4–37.4)
MCV RBC AUTO: 95 FL (ref 82–98)
MONOCYTES # BLD AUTO: 0.36 THOUSAND/ÂΜL (ref 0.17–1.22)
MONOCYTES NFR BLD AUTO: 7 % (ref 4–12)
NEUTROPHILS # BLD AUTO: 2.74 THOUSANDS/ÂΜL (ref 1.85–7.62)
NEUTS SEG NFR BLD AUTO: 57 % (ref 43–75)
NONHDLC SERPL-MCNC: 183 MG/DL
NRBC BLD AUTO-RTO: 0 /100 WBCS
PLATELET # BLD AUTO: 164 THOUSANDS/UL (ref 149–390)
PMV BLD AUTO: 10.9 FL (ref 8.9–12.7)
POTASSIUM SERPL-SCNC: 3.9 MMOL/L (ref 3.5–5.3)
PROT SERPL-MCNC: 7.7 G/DL (ref 6.4–8.4)
RBC # BLD AUTO: 4.79 MILLION/UL (ref 3.88–5.62)
SODIUM SERPL-SCNC: 137 MMOL/L (ref 135–147)
T4 FREE SERPL-MCNC: 0.82 NG/DL (ref 0.76–1.46)
TRIGL SERPL-MCNC: 545 MG/DL
TSH SERPL DL<=0.05 MIU/L-ACNC: 2.42 UIU/ML (ref 0.45–4.5)
WBC # BLD AUTO: 4.9 THOUSAND/UL (ref 4.31–10.16)

## 2023-03-16 LAB — PSA SERPL-MCNC: 0.3 NG/ML (ref 0–4)

## 2023-03-24 ENCOUNTER — CLINICAL SUPPORT (OUTPATIENT)
Dept: NUTRITION | Facility: HOSPITAL | Age: 63
End: 2023-03-24

## 2023-03-24 VITALS — WEIGHT: 180 LBS | HEIGHT: 70 IN | BODY MASS INDEX: 25.77 KG/M2

## 2023-03-24 DIAGNOSIS — E78.5 HYPERLIPIDEMIA, UNSPECIFIED HYPERLIPIDEMIA TYPE: Primary | ICD-10-CM

## 2023-03-25 NOTE — PROGRESS NOTES
Initial Nutrition Assessment Form    Patient Name: Lizzeth Christianson    YOB: 1960    Sex: Male     Assessment Date: 3/24/2023  Start Time: 9 am Stop Time: 10 am Total Minutes: 60     Data:  Present at session: Self and wife, Alessandro Mata   Parent Concerns: Patient concerned about increasing blood sugars  He states he has had high cholesterol for some time but the increase in blood sugars is new  Medical Dx/Reason for Referral: Hyperlipidemia and prediabetes   Past Medical History:   Diagnosis Date   • High cholesterol    • Hypertension        Current Outpatient Medications   Medication Sig Dispense Refill   • aspirin 81 mg chewable tablet Chew 81 mg daily     • famotidine (PEPCID) 40 MG tablet Take 40 mg by mouth daily     • L-Methylfolate-B6-B12 (FOLTX PO) Take 1 tablet by mouth in the morning     • valsartan (DIOVAN) 80 mg tablet Take 80 mg by mouth daily       No current facility-administered medications for this visit  Additional Meds/Supplements: Medications reviewed; already on statin medication and to start metformin 500 mg BID   Special Learning Needs: none   Height: HC Readings from Last 3 Encounters:   No data found for [facility]       Weight: Wt Readings from Last 3 Encounters:   01/31/22 82 3 kg (181 lb 7 oz)     There is no height or weight on file to calculate BMI  Recent Weight Change: [x]Yes     []No  Amount:       Energy Needs: 3222 calories/280 g carbs day (30 viktor/kg IBW with 50% total calories from carbs)   No Known Allergies    Social History     Substance and Sexual Activity   Alcohol Use Yes    Comment: social       Social History     Tobacco Use   Smoking Status Never   Smokeless Tobacco Never       Who shops? spouse   Who cooks? spouse   Exercise: Although patient does partake in any routine exercise, he is very active with construction work   Prior Counseling?  []Yes     [x]No  When:    Why:         Diet Hx:  Breakfast: Skips breakfast most days; will have 1 cup coffee with flavor creamer   Lunch: 1-2 pm:  [place]avre with lunch meats on rye with both mustard and hussein or hogie, water         Dinner: 5-7 pm:  Cooked meal with protein, not breaded or fried; starch including potato, pasta and rice and all vegetables; portions probably larger than ideal since patient doesn't eat much during the day; diet ice tea    Will have lite beer, unsure of exact quanity         Snacks: Throughout the day, patient may snack on fruit    HS - popcorn or chips or corn chips        Nutrition Diagnosis:   Excessive fat intake  related to Food and nutrition-related knowledge deficit concerning appropriate amount of dietary fat as evidenced by  Cholesterol >200 mg/dL , LDL cholesterol >100 mg/dL, HDL cholesterol <40 mg/dL, triglycerides >150 mg/dL       Medical Nutrition Therapy Intervention:  [x]Individualized Meal Plan:  Low fat, carb controlled meal plan reviewed with patient and wife [x]Understanding Lab Values:  Reviewed blood work and goals   []Basic Pathophysiology of Disease []Food/Medication Interactions   []Food Diary [x]Exercise:  Discussed the benefit of routine planned exercise; goal 150 minutes per week   [x]Lifestyle/Behavior Modification Techniques:  Discussed the importance of balanced meals spaced throughout the day [x]Medication, Mechanism of Action:  Discussed possible side effects of starting metformin   [x]Label Reading:  Reviewed food label for serving size, total carbs and bad fats [x]Self Blood Glucose Monitoring:  Demonstrated how the use glucometer but unable to have patient test as he didn't bring his test strip; recommend he test once daily, fasting    [x]Weight/BMI Goals:  Weight maintenance []Other -    Other Notes:        Comprehension: []Excellent  []Very Good  [x]Good  []Fair   []Poor    Receptivity: []Excellent  []Very Good  [x]Good  []Fair   []Poor    Expected Compliance: []Excellent  []Very Good  [x]Good  []Fair   []Poor        Goals:  1   Limit carb intake to less than 280 g per day and decrease saturated fat, no trans fat   2  Decrease alcohol intake, 2 beers or less per day   3  Test blood sugar once daily, fasting       Follow up appointment to be determined  RD contact information provided  Labs:  CMP  Lab Results   Component Value Date    K 3 9 03/15/2023     03/15/2023    CO2 28 03/15/2023    BUN 15 03/15/2023    CREATININE 0 85 03/15/2023    GLUF 149 (H) 03/15/2023    CALCIUM 9 7 03/15/2023    AST 91 (H) 03/15/2023     (H) 03/15/2023    ALKPHOS 79 03/15/2023    EGFR 93 03/15/2023       BMP  Lab Results   Component Value Date    CALCIUM 9 7 03/15/2023    K 3 9 03/15/2023    CO2 28 03/15/2023     03/15/2023    BUN 15 03/15/2023    CREATININE 0 85 03/15/2023       Lipids  No results found for: CHOL  Lab Results   Component Value Date    HDL 61 03/15/2023    HDL 50 02/11/2022    HDL 62 04/15/2021     Lab Results   Component Value Date    LDLCALC  03/15/2023      Comment:      Calculated LDL invalid, triglycerides >400 mg/dl  This screening LDL is a calculated result  It does not have the accuracy of the Direct Measured LDL in the monitoring of patients with hyperlipidemia and/or statin therapy  Direct Measure LDL (BBV670) must be ordered separately in these patients      LDLCALC 186 (H) 02/11/2022    LDLCALC 94 04/15/2021     Lab Results   Component Value Date    TRIG 545 (H) 03/15/2023    TRIG 396 (H) 02/11/2022    TRIG 203 (H) 04/15/2021     No results found for: CHOLHDL    Hemoglobin A1C  Lab Results   Component Value Date    HGBA1C 6 2 (H) 03/15/2023       Fasting Glucose  Lab Results   Component Value Date    GLUF 149 (H) 03/15/2023       Insulin     Thyroid  No results found for: TSH, E1DYALI, J6JEIRK, THYROIDAB    Hepatic Function Panel  Lab Results   Component Value Date     (H) 03/15/2023    AST 91 (H) 03/15/2023     (H) 02/11/2022    ALKPHOS 79 03/15/2023       Celiac Disease Antibody Panel  No results found for: ENDOMYSIAL IGA, GLIADIN IGA, GLIADIN IGG, IGA, TISSUE TRANSGLUT AB, TTG IGA   Iron  No results found for: IRON, TIBC, FERRITIN    Vitamins  No results found for: VITAMIN B2   No results found for: NICOTINAMIDE, NICOTINIC ACID   No results found for: VITAMINB6  Lab Results   Component Value Date    UUWFGPHI24 2,894 (H) 07/14/2020     No results found for: VITB5  No results found for: P1IDSSAA  No results found for: THYROGLB  No results found for: VITAMIN K   No results found for: 25-HYDROXY VIT D   No components found for: Antonia [address], [address]  [address] [facility] [id number]

## 2023-08-15 DIAGNOSIS — K21.9 GASTROESOPHAGEAL REFLUX DISEASE, UNSPECIFIED WHETHER ESOPHAGITIS PRESENT: Primary | ICD-10-CM

## 2023-08-15 RX ORDER — LANSOPRAZOLE 30 MG/1
30 CAPSULE, DELAYED RELEASE ORAL DAILY
Qty: 90 CAPSULE | Refills: 1 | Status: SHIPPED | OUTPATIENT
Start: 2023-08-15

## 2023-08-15 RX ORDER — LANSOPRAZOLE 30 MG/1
CAPSULE, DELAYED RELEASE ORAL
COMMUNITY
Start: 2023-06-18 | End: 2023-08-15 | Stop reason: SDUPTHER

## 2023-09-26 DIAGNOSIS — E78.5 HYPERLIPIDEMIA, UNSPECIFIED HYPERLIPIDEMIA TYPE: Primary | ICD-10-CM

## 2023-09-26 RX ORDER — ATORVASTATIN CALCIUM 20 MG/1
20 TABLET, FILM COATED ORAL DAILY
Qty: 90 TABLET | Refills: 1 | Status: SHIPPED | OUTPATIENT
Start: 2023-09-26

## 2023-09-26 RX ORDER — ATORVASTATIN CALCIUM 20 MG/1
20 TABLET, FILM COATED ORAL DAILY
COMMUNITY
Start: 2023-08-06 | End: 2023-09-26 | Stop reason: SDUPTHER

## 2023-11-21 ENCOUNTER — TELEPHONE (OUTPATIENT)
Dept: FAMILY MEDICINE CLINIC | Facility: CLINIC | Age: 63
End: 2023-11-21

## 2023-11-30 ENCOUNTER — RA CDI HCC (OUTPATIENT)
Dept: OTHER | Facility: HOSPITAL | Age: 63
End: 2023-11-30

## 2023-12-12 ENCOUNTER — OFFICE VISIT (OUTPATIENT)
Dept: FAMILY MEDICINE CLINIC | Facility: CLINIC | Age: 63
End: 2023-12-12
Payer: COMMERCIAL

## 2023-12-12 ENCOUNTER — APPOINTMENT (OUTPATIENT)
Age: 63
End: 2023-12-12
Payer: COMMERCIAL

## 2023-12-12 VITALS
HEART RATE: 71 BPM | TEMPERATURE: 96.6 F | BODY MASS INDEX: 25.91 KG/M2 | DIASTOLIC BLOOD PRESSURE: 80 MMHG | WEIGHT: 181 LBS | OXYGEN SATURATION: 99 % | SYSTOLIC BLOOD PRESSURE: 150 MMHG | HEIGHT: 70 IN

## 2023-12-12 DIAGNOSIS — R73.03 PREDIABETES: ICD-10-CM

## 2023-12-12 DIAGNOSIS — M79.622 LEFT UPPER ARM PAIN: ICD-10-CM

## 2023-12-12 DIAGNOSIS — E55.9 VITAMIN D DEFICIENCY: ICD-10-CM

## 2023-12-12 DIAGNOSIS — I10 ESSENTIAL HYPERTENSION: ICD-10-CM

## 2023-12-12 DIAGNOSIS — M25.562 ARTHRALGIA OF LEFT KNEE: ICD-10-CM

## 2023-12-12 DIAGNOSIS — E78.5 HYPERLIPIDEMIA, UNSPECIFIED HYPERLIPIDEMIA TYPE: ICD-10-CM

## 2023-12-12 DIAGNOSIS — R79.89 ABNORMAL LFTS: Primary | ICD-10-CM

## 2023-12-12 DIAGNOSIS — K22.70 BARRETT'S ESOPHAGUS WITHOUT DYSPLASIA: ICD-10-CM

## 2023-12-12 DIAGNOSIS — Z23 ENCOUNTER FOR IMMUNIZATION: ICD-10-CM

## 2023-12-12 DIAGNOSIS — E11.9 TYPE 2 DIABETES MELLITUS WITHOUT COMPLICATION, WITHOUT LONG-TERM CURRENT USE OF INSULIN (HCC): Primary | ICD-10-CM

## 2023-12-12 PROBLEM — M72.2 PLANTAR FASCIITIS: Status: RESOLVED | Noted: 2022-01-31 | Resolved: 2023-12-12

## 2023-12-12 LAB
25(OH)D3 SERPL-MCNC: 25 NG/ML (ref 30–100)
CHOLEST SERPL-MCNC: 185 MG/DL
CREAT UR-MCNC: 113.4 MG/DL
CRP SERPL QL: 3.6 MG/L
ERYTHROCYTE [SEDIMENTATION RATE] IN BLOOD: 14 MM/HOUR (ref 0–19)
EST. AVERAGE GLUCOSE BLD GHB EST-MCNC: 148 MG/DL
HBA1C MFR BLD: 6.8 %
HDLC SERPL-MCNC: 48 MG/DL
LDLC SERPL CALC-MCNC: 87 MG/DL (ref 0–100)
MICROALBUMIN UR-MCNC: <7 MG/L
MICROALBUMIN/CREAT 24H UR: <6 MG/G CREATININE (ref 0–30)
NONHDLC SERPL-MCNC: 137 MG/DL
TRIGL SERPL-MCNC: 252 MG/DL

## 2023-12-12 PROCEDURE — 90715 TDAP VACCINE 7 YRS/> IM: CPT

## 2023-12-12 PROCEDURE — 82306 VITAMIN D 25 HYDROXY: CPT

## 2023-12-12 PROCEDURE — 82043 UR ALBUMIN QUANTITATIVE: CPT

## 2023-12-12 PROCEDURE — 83036 HEMOGLOBIN GLYCOSYLATED A1C: CPT

## 2023-12-12 PROCEDURE — 36415 COLL VENOUS BLD VENIPUNCTURE: CPT

## 2023-12-12 PROCEDURE — 85652 RBC SED RATE AUTOMATED: CPT

## 2023-12-12 PROCEDURE — 99214 OFFICE O/P EST MOD 30 MIN: CPT | Performed by: FAMILY MEDICINE

## 2023-12-12 PROCEDURE — 82570 ASSAY OF URINE CREATININE: CPT

## 2023-12-12 PROCEDURE — 80061 LIPID PANEL: CPT

## 2023-12-12 PROCEDURE — 90471 IMMUNIZATION ADMIN: CPT

## 2023-12-12 PROCEDURE — 86618 LYME DISEASE ANTIBODY: CPT

## 2023-12-12 PROCEDURE — 86140 C-REACTIVE PROTEIN: CPT

## 2023-12-12 RX ORDER — VALSARTAN 160 MG/1
160 TABLET ORAL DAILY
Qty: 90 TABLET | Refills: 3 | Status: SHIPPED | OUTPATIENT
Start: 2023-12-12

## 2023-12-12 RX ORDER — LANSOPRAZOLE 30 MG/1
30 CAPSULE, DELAYED RELEASE ORAL DAILY
Qty: 90 CAPSULE | Refills: 1 | Status: CANCELLED | OUTPATIENT
Start: 2023-12-12

## 2023-12-12 RX ORDER — VALSARTAN 80 MG/1
80 TABLET ORAL DAILY
Qty: 90 TABLET | Refills: 1 | Status: CANCELLED | OUTPATIENT
Start: 2023-12-12

## 2023-12-12 RX ORDER — ATORVASTATIN CALCIUM 20 MG/1
20 TABLET, FILM COATED ORAL DAILY
Qty: 90 TABLET | Refills: 1 | Status: CANCELLED | OUTPATIENT
Start: 2023-12-12

## 2023-12-12 NOTE — PROGRESS NOTES
Name: Geovanni Bates      : 1960      MRN: [id number]  Encounter Provider: Sunshine Esienberg MD  Encounter Date: 2023   Encounter department: [facility]     1. Abnormal LFTs  Comments:  ? etiology with neg Hep profile and normal liver though GGT high    ? ? due to statin vs ETOH   await labs  ? stop statin and U with G-I    2. Prediabetes  Comments:  DIet again emphasized   await labs   ? ? will need meds and definitely closer monitoring  Orders:  -     Hemoglobin A1C; Future  -     Albumin / creatinine urine ratio; Future    3. Essential hypertension  Comments:  in crease valsartan to 160 mg daily   cont monitoring and call if not improved  Orders:  -     valsartan (DIOVAN) 160 mg tablet; Take 1 tablet (160 mg total) by mouth daily    4. Hayes's esophagus without dysplasia  Comments:  encouraged F?U EGD and G-I re: above problems    5. Arthralgia of left knee  Comments:  likely DJD  ; X-ray   trial of topicals   ? ? MRI and referral  Orders:  -     Lyme Total AB W Reflex to IGM/IGG; Future  -     Sedimentation rate, automated; Future; Expected date: 2023  -     C-reactive protein; Future  -     XR knee 3 vw left non injury; Future; Expected date: 2023    6. Left upper arm pain  Comments:  ? burisitis/tendonitis     trail of topicals    7. Encounter for immunization  Comments:  adacel updated    encouraged other immun which he is reluctant to obtain  Orders:  -     Tdap Vaccine greater than or equal to 6yo    8. Hyperlipidemia, unspecified hyperlipidemia type  Comments:  very well-controlled with statin ( trigs high due to sugar)   ? ? will need to stops tatin as above  Orders:  -     Lipid panel; Future    9. Vitamin D deficiency  -     Vitamin D 25 hydroxy; Future      BMI Counseling: Body mass index is 25.97 kg/m².  The BMI is above normal. Nutrition recommendations include decreasing portion sizes, limiting drinks that contain sugar, moderation in carbohydrate intake and reducing intake of cholesterol. Exercise recommendations include moderate physical activity 150 minutes/week. No pharmacotherapy was ordered. Rationale for BMI follow-up plan is due to patient being overweight or obese. Depression Screening and Follow-up Plan: Patient was screened for depression during today's encounter. They screened negative with a PHQ-2 score of 0. Subjective      Patient did see dietitian and has tried to monitor his diet, not eating a lot of sweets but still consuming carbohydrates. He drinks mostly water and unsweetened beverages throughout the day. He did monitor sugars for a while but quit not remembering what values he was getting. He denies any known family history of diabetes, GI problems, or cardiovascular disease. Patient states he feels well other than arthritis especially with ongoing left knee pain and with intermittent sharp shooting pains over his left upper arm without radiation and without joint pain. No numbness. He denies any swelling of any joints. He denies any buckling of his knee. He really has tried no treatment for his arthritic symptoms. He occasionally monitors his blood pressure stating most readings average about what we got today. He does not use a saltshaker but not sure how much salt he is getting in his diet. He denies any associated headaches, chest pain, shortness of breath, palpitations, or ankle swelling. He is retired but still working construction. He is scheduling follow-ups with GI since he is due for routine colonoscopy and also was notified he is due for routine EGD due to his diagnosis of Hayes's. He denies any GI symptoms whatsoever. He denies overusage of Tylenol; he does drink about 4 [employer] daily; he denies any other alcohol intake.       Review of Systems    Current Outpatient Medications on File Prior to Visit   Medication Sig   • aspirin 81 mg chewable tablet Chew 81 mg daily   • atorvastatin (LIPITOR) 20 mg tablet Take 1 tablet (20 mg total) by mouth daily   • L-Methylfolate-B6-B12 (FOLTX PO) Take 1 tablet by mouth in the morning   • lansoprazole (PREVACID) 30 mg capsule Take 1 capsule (30 mg total) by mouth daily   • [DISCONTINUED] valsartan (DIOVAN) 80 mg tablet Take 80 mg by mouth daily   • [DISCONTINUED] famotidine (PEPCID) 40 MG tablet Take 40 mg by mouth daily (Patient not taking: Reported on 12/12/2023)       Objective     /80 (BP Location: Left arm, Patient Position: Sitting, Cuff Size: Large)   Pulse 71   Temp (!) 96.6 °F (35.9 °C) (Tympanic)   Ht 5' 10" (1.778 m)   Wt 82.1 kg (181 lb)   SpO2 99%   BMI 25.97 kg/m²     Physical Exam  Constitutional:       Appearance: Normal appearance. Neck:      Vascular: No carotid bruit. Cardiovascular:      Rate and Rhythm: Normal rate and regular rhythm. Pulses: Normal pulses. Heart sounds: Normal heart sounds. No murmur heard. Pulmonary:      Effort: Pulmonary effort is normal.      Breath sounds: Normal breath sounds. Abdominal:      Palpations: Abdomen is soft. There is no mass. Tenderness: There is no abdominal tenderness. Musculoskeletal:      Right lower leg: No edema. Left lower leg: No edema. Comments: Rt knee without swelling redness; warmth  no TTP good range of motion without pain and without increased laxity no anterior drawer sign. Patient points to posterior knee as area of his pain but denies any tenderness to palpation over area and without any palpable Baker's cyst or swelling. Left arm with full range of motion of shoulder and elbow without pain; joints without swelling, abnormalities, or tenderness to palpation. Upper arm without tenderness to palpation   Lymphadenopathy:      Cervical: No cervical adenopathy. Neurological:      Mental Status: He is alert and oriented to person, place, and time.        Humberto Cochran MD

## 2023-12-13 LAB — B BURGDOR IGG+IGM SER QL IA: NEGATIVE

## 2023-12-20 ENCOUNTER — APPOINTMENT (OUTPATIENT)
Dept: RADIOLOGY | Facility: CLINIC | Age: 63
End: 2023-12-20
Payer: COMMERCIAL

## 2023-12-20 DIAGNOSIS — M25.562 ARTHRALGIA OF LEFT KNEE: ICD-10-CM

## 2023-12-20 PROCEDURE — 73562 X-RAY EXAM OF KNEE 3: CPT

## 2023-12-22 PROBLEM — E11.9 TYPE 2 DIABETES MELLITUS WITHOUT COMPLICATION, WITHOUT LONG-TERM CURRENT USE OF INSULIN (HCC): Status: ACTIVE | Noted: 2023-12-22

## 2024-02-03 DIAGNOSIS — K21.9 GASTROESOPHAGEAL REFLUX DISEASE, UNSPECIFIED WHETHER ESOPHAGITIS PRESENT: ICD-10-CM

## 2024-02-05 RX ORDER — LANSOPRAZOLE 30 MG/1
30 CAPSULE, DELAYED RELEASE ORAL DAILY
Qty: 90 CAPSULE | Refills: 1 | Status: SHIPPED | OUTPATIENT
Start: 2024-02-05

## 2024-02-27 ENCOUNTER — TELEPHONE (OUTPATIENT)
Dept: FAMILY MEDICINE CLINIC | Facility: CLINIC | Age: 64
End: 2024-02-27

## 2024-02-27 NOTE — TELEPHONE ENCOUNTER
Called pt to schedule his DM check up. He reports that he was recently D/C from [facility] after having had cardiac bypass surgery.  He states that before the surgery he had started seeing an endocrinologist to help control his diabetes. I asked him if he had an new PCP, and he said he wasn't sure of that.  He said he will get back in touch with us either to make an appt or to let us know that he will be seeing a dfferent PCP

## 2024-03-11 DIAGNOSIS — E78.5 HYPERLIPIDEMIA, UNSPECIFIED HYPERLIPIDEMIA TYPE: ICD-10-CM

## 2024-03-11 RX ORDER — ATORVASTATIN CALCIUM 20 MG/1
20 TABLET, FILM COATED ORAL DAILY
Qty: 90 TABLET | Refills: 1 | Status: SHIPPED | OUTPATIENT
Start: 2024-03-11

## 2024-03-11 NOTE — TELEPHONE ENCOUNTER
Patient notified, will be calling back to get scheduled. States he has appts with Cardiology and surgeon office so need to see when those visits are first.

## 2024-04-11 ENCOUNTER — TELEPHONE (OUTPATIENT)
Dept: FAMILY MEDICINE CLINIC | Facility: CLINIC | Age: 64
End: 2024-04-11

## 2024-04-11 NOTE — TELEPHONE ENCOUNTER
Spoke to patient and schedule annual appt with Dr. Vivar for 08/01/2024.  I offered for him to schedule with Giselle for this but he wanted to see Dr. Vivar.

## 2024-06-10 LAB
LEFT EYE DIABETIC RETINOPATHY: NORMAL
RIGHT EYE DIABETIC RETINOPATHY: NORMAL
SEVERITY (EYE EXAM): NORMAL

## 2024-06-14 DIAGNOSIS — E11.9 TYPE 2 DIABETES MELLITUS WITHOUT COMPLICATION, WITHOUT LONG-TERM CURRENT USE OF INSULIN (HCC): ICD-10-CM

## 2024-07-15 ENCOUNTER — TELEPHONE (OUTPATIENT)
Age: 64
End: 2024-07-15

## 2024-07-15 DIAGNOSIS — E11.9 TYPE 2 DIABETES MELLITUS WITHOUT COMPLICATION, WITHOUT LONG-TERM CURRENT USE OF INSULIN (HCC): ICD-10-CM

## 2024-07-15 NOTE — TELEPHONE ENCOUNTER
Spoke to patient and told him we have the records from [facility].  I will copy office notes for him but I don't have the whole chart.  I would need to get that when Shell is back in the office.

## 2024-07-15 NOTE — TELEPHONE ENCOUNTER
Patient aware has not been seen in this office and needs to sign a release from [facility] to get records and dr crews will address at 8/1 lv

## 2024-07-15 NOTE — TELEPHONE ENCOUNTER
Patient is calling to request a print out of all his medications and when they were first prescribed. He also requests diagnosis codes for his main illnesses and when they were first diagnosed. He is wondering if that is something that can be printed out for him before Friday. Patient requests a call back to advise.

## 2024-07-16 NOTE — TELEPHONE ENCOUNTER
Patient aware records are ready to  and he needs to sign a release.  I copied office notes from 04/15/2021 and 02/11/2022 from [facility].  12/12/2023 office notes from [facility] and [facility] On Friday.  I told him that if the [facility] needs more notes let us know.

## 2024-08-01 ENCOUNTER — APPOINTMENT (OUTPATIENT)
Age: 64
End: 2024-08-01
Payer: COMMERCIAL

## 2024-08-01 ENCOUNTER — OFFICE VISIT (OUTPATIENT)
Dept: FAMILY MEDICINE CLINIC | Facility: CLINIC | Age: 64
End: 2024-08-01
Payer: COMMERCIAL

## 2024-08-01 VITALS
BODY MASS INDEX: 26 KG/M2 | SYSTOLIC BLOOD PRESSURE: 130 MMHG | TEMPERATURE: 97 F | HEART RATE: 74 BPM | OXYGEN SATURATION: 97 % | DIASTOLIC BLOOD PRESSURE: 80 MMHG | HEIGHT: 70 IN | WEIGHT: 181.6 LBS

## 2024-08-01 DIAGNOSIS — E11.9 TYPE 2 DIABETES MELLITUS WITHOUT COMPLICATION, WITHOUT LONG-TERM CURRENT USE OF INSULIN (HCC): ICD-10-CM

## 2024-08-01 DIAGNOSIS — Z12.5 SCREENING FOR PROSTATE CANCER: ICD-10-CM

## 2024-08-01 DIAGNOSIS — E78.2 MIXED HYPERLIPIDEMIA: ICD-10-CM

## 2024-08-01 DIAGNOSIS — I25.810 CORONARY ARTERY DISEASE INVOLVING CORONARY BYPASS GRAFT OF NATIVE HEART WITHOUT ANGINA PECTORIS: ICD-10-CM

## 2024-08-01 DIAGNOSIS — G62.9 NEUROPATHY: ICD-10-CM

## 2024-08-01 DIAGNOSIS — Z23 ENCOUNTER FOR IMMUNIZATION: ICD-10-CM

## 2024-08-01 DIAGNOSIS — R79.89 ABNORMAL LFTS: ICD-10-CM

## 2024-08-01 DIAGNOSIS — Z00.00 ENCOUNTER FOR ANNUAL PHYSICAL EXAM: Primary | ICD-10-CM

## 2024-08-01 DIAGNOSIS — M1A.00X0 IDIOPATHIC CHRONIC GOUT WITHOUT TOPHUS, UNSPECIFIED SITE: ICD-10-CM

## 2024-08-01 DIAGNOSIS — I10 ESSENTIAL HYPERTENSION: ICD-10-CM

## 2024-08-01 DIAGNOSIS — Z08 ENCOUNTER FOR FOLLOW-UP SURVEILLANCE OF COLON CANCER: ICD-10-CM

## 2024-08-01 DIAGNOSIS — E55.9 VITAMIN D DEFICIENCY: ICD-10-CM

## 2024-08-01 DIAGNOSIS — Z85.038 ENCOUNTER FOR FOLLOW-UP SURVEILLANCE OF COLON CANCER: ICD-10-CM

## 2024-08-01 PROBLEM — R20.2 PARESTHESIA OF BILATERAL LEGS: Status: ACTIVE | Noted: 2024-08-01

## 2024-08-01 LAB
25(OH)D3 SERPL-MCNC: 34.6 NG/ML (ref 30–100)
ALBUMIN SERPL BCG-MCNC: 4.4 G/DL (ref 3.5–5)
ALP SERPL-CCNC: 67 U/L (ref 34–104)
ALT SERPL W P-5'-P-CCNC: 57 U/L (ref 7–52)
ANION GAP SERPL CALCULATED.3IONS-SCNC: 11 MMOL/L (ref 4–13)
AST SERPL W P-5'-P-CCNC: 39 U/L (ref 13–39)
BASOPHILS # BLD AUTO: 0.09 THOUSANDS/ÂΜL (ref 0–0.1)
BASOPHILS NFR BLD AUTO: 1 % (ref 0–1)
BILIRUB SERPL-MCNC: 0.52 MG/DL (ref 0.2–1)
BUN SERPL-MCNC: 9 MG/DL (ref 5–25)
CALCIUM SERPL-MCNC: 9.5 MG/DL (ref 8.4–10.2)
CHLORIDE SERPL-SCNC: 103 MMOL/L (ref 96–108)
CHOLEST SERPL-MCNC: 167 MG/DL
CO2 SERPL-SCNC: 26 MMOL/L (ref 21–32)
CREAT SERPL-MCNC: 0.76 MG/DL (ref 0.6–1.3)
CREAT UR-MCNC: 46.1 MG/DL
EOSINOPHIL # BLD AUTO: 0.19 THOUSAND/ÂΜL (ref 0–0.61)
EOSINOPHIL NFR BLD AUTO: 2 % (ref 0–6)
ERYTHROCYTE [DISTWIDTH] IN BLOOD BY AUTOMATED COUNT: 13.7 % (ref 11.6–15.1)
EST. AVERAGE GLUCOSE BLD GHB EST-MCNC: 143 MG/DL
GFR SERPL CREATININE-BSD FRML MDRD: 97 ML/MIN/1.73SQ M
GLUCOSE P FAST SERPL-MCNC: 121 MG/DL (ref 65–99)
HBA1C MFR BLD: 6.6 %
HCT VFR BLD AUTO: 44 % (ref 36.5–49.3)
HDLC SERPL-MCNC: 62 MG/DL
HGB BLD-MCNC: 15.1 G/DL (ref 12–17)
IMM GRANULOCYTES # BLD AUTO: 0.04 THOUSAND/UL (ref 0–0.2)
IMM GRANULOCYTES NFR BLD AUTO: 0 % (ref 0–2)
LDLC SERPL CALC-MCNC: 76 MG/DL (ref 0–100)
LYMPHOCYTES # BLD AUTO: 1.33 THOUSANDS/ÂΜL (ref 0.6–4.47)
LYMPHOCYTES NFR BLD AUTO: 13 % (ref 14–44)
MAGNESIUM SERPL-MCNC: 1.8 MG/DL (ref 1.9–2.7)
MCH RBC QN AUTO: 31.1 PG (ref 26.8–34.3)
MCHC RBC AUTO-ENTMCNC: 34.3 G/DL (ref 31.4–37.4)
MCV RBC AUTO: 91 FL (ref 82–98)
MICROALBUMIN UR-MCNC: 14.2 MG/L
MICROALBUMIN/CREAT 24H UR: 31 MG/G CREATININE (ref 0–30)
MONOCYTES # BLD AUTO: 0.63 THOUSAND/ÂΜL (ref 0.17–1.22)
MONOCYTES NFR BLD AUTO: 6 % (ref 4–12)
NEUTROPHILS # BLD AUTO: 8.14 THOUSANDS/ÂΜL (ref 1.85–7.62)
NEUTS SEG NFR BLD AUTO: 78 % (ref 43–75)
NONHDLC SERPL-MCNC: 105 MG/DL
NRBC BLD AUTO-RTO: 0 /100 WBCS
PLATELET # BLD AUTO: 173 THOUSANDS/UL (ref 149–390)
PMV BLD AUTO: 10.6 FL (ref 8.9–12.7)
POTASSIUM SERPL-SCNC: 4.3 MMOL/L (ref 3.5–5.3)
PROT SERPL-MCNC: 7.3 G/DL (ref 6.4–8.4)
PSA SERPL-MCNC: 0.53 NG/ML (ref 0–4)
RBC # BLD AUTO: 4.86 MILLION/UL (ref 3.88–5.62)
SODIUM SERPL-SCNC: 140 MMOL/L (ref 135–147)
TRIGL SERPL-MCNC: 145 MG/DL
TSH SERPL DL<=0.05 MIU/L-ACNC: 1.66 UIU/ML (ref 0.45–4.5)
URATE SERPL-MCNC: 4 MG/DL (ref 3.5–8.5)
VIT B12 SERPL-MCNC: 184 PG/ML (ref 180–914)
WBC # BLD AUTO: 10.42 THOUSAND/UL (ref 4.31–10.16)

## 2024-08-01 PROCEDURE — 3044F HG A1C LEVEL LT 7.0%: CPT | Performed by: FAMILY MEDICINE

## 2024-08-01 PROCEDURE — 3079F DIAST BP 80-89 MM HG: CPT | Performed by: FAMILY MEDICINE

## 2024-08-01 PROCEDURE — G0103 PSA SCREENING: HCPCS

## 2024-08-01 PROCEDURE — 82043 UR ALBUMIN QUANTITATIVE: CPT

## 2024-08-01 PROCEDURE — 3061F NEG MICROALBUMINURIA REV: CPT | Performed by: FAMILY MEDICINE

## 2024-08-01 PROCEDURE — 99396 PREV VISIT EST AGE 40-64: CPT | Performed by: FAMILY MEDICINE

## 2024-08-01 PROCEDURE — 84550 ASSAY OF BLOOD/URIC ACID: CPT

## 2024-08-01 PROCEDURE — 85025 COMPLETE CBC W/AUTO DIFF WBC: CPT

## 2024-08-01 PROCEDURE — 82306 VITAMIN D 25 HYDROXY: CPT

## 2024-08-01 PROCEDURE — 84443 ASSAY THYROID STIM HORMONE: CPT

## 2024-08-01 PROCEDURE — 83735 ASSAY OF MAGNESIUM: CPT

## 2024-08-01 PROCEDURE — 90677 PCV20 VACCINE IM: CPT | Performed by: FAMILY MEDICINE

## 2024-08-01 PROCEDURE — 80061 LIPID PANEL: CPT

## 2024-08-01 PROCEDURE — 83036 HEMOGLOBIN GLYCOSYLATED A1C: CPT

## 2024-08-01 PROCEDURE — 3075F SYST BP GE 130 - 139MM HG: CPT | Performed by: FAMILY MEDICINE

## 2024-08-01 PROCEDURE — 82570 ASSAY OF URINE CREATININE: CPT

## 2024-08-01 PROCEDURE — 82607 VITAMIN B-12: CPT

## 2024-08-01 PROCEDURE — 90471 IMMUNIZATION ADMIN: CPT | Performed by: FAMILY MEDICINE

## 2024-08-01 PROCEDURE — 80053 COMPREHEN METABOLIC PANEL: CPT

## 2024-08-01 PROCEDURE — 36415 COLL VENOUS BLD VENIPUNCTURE: CPT

## 2024-08-01 RX ORDER — ALLOPURINOL 300 MG/1
300 TABLET ORAL DAILY
COMMUNITY

## 2024-08-01 RX ORDER — AMLODIPINE BESYLATE 10 MG/1
5 TABLET ORAL DAILY
COMMUNITY
Start: 2024-03-21 | End: 2025-03-22

## 2024-08-01 RX ORDER — ROSUVASTATIN CALCIUM 40 MG/1
40 TABLET, COATED ORAL
COMMUNITY
Start: 2024-03-05

## 2024-08-01 RX ORDER — CLOPIDOGREL BISULFATE 75 MG/1
75 TABLET ORAL DAILY
COMMUNITY
Start: 2024-06-04

## 2024-08-01 RX ORDER — METOPROLOL SUCCINATE 50 MG/1
TABLET, EXTENDED RELEASE ORAL
COMMUNITY

## 2024-08-01 NOTE — ASSESSMENT & PLAN NOTE
Last labs revealed markedly elevated triglyceride hopefully improve now with diet changes.  Will call patient with lab results and as to any further treatment adjustments needed

## 2024-08-01 NOTE — ASSESSMENT & PLAN NOTE
Questionably due to recent surgery with negative exam today very good circulation and without any prior complaints of back problems's symptoms improved with increased ambulation he will continue to monitor and call with any worsening.

## 2024-08-01 NOTE — ASSESSMENT & PLAN NOTE
Routine labs ordered today.  Referral made to GI since he is agreeable to colonoscopy.  Prevnar given and I advised he check insurance coverage for Shingrix.  His Adacel is up-to-date.  He is not interested in further COVID vaccines or annual flu vaccines.  Encouraged cont Healthy lifestyle

## 2024-08-01 NOTE — PROGRESS NOTES
"Adult Annual Physical  Name: Linden Flores      : 1960      MRN: [id number]  Encounter Provider: Madison Vivar MD  Encounter Date: 2024   Encounter department: [facility]    Assessment & Plan   1. Encounter for annual physical exam  Assessment & Plan:  Routine labs ordered today.  Referral made to GI since he is agreeable to colonoscopy.  Prevnar given and I advised he check insurance coverage for Shingrix.  His Adacel is up-to-date.  He is not interested in further COVID vaccines or annual flu vaccines.  Encouraged cont Healthy lifestyle    2. Coronary artery disease involving coronary bypass graft of native heart without angina pectoris  Assessment & Plan:  Doing quite well and back to normal activities.  He will continue same medications; I encouraged his continued good \"healthy\" diet; and will have follow-up with cardiology in the fall.  3. Type 2 diabetes mellitus without complication, without long-term current use of insulin ([facility])  Assessment & Plan:  He is to call me if he does not hear from Melissa within the next 1 to 2 weeks.  He is up-to-date with eye exams and foot exam performed today. Proper foot care discussed   routine labs ordered will await report from Melissa as to her continued care and how he will continue to monitor his sugars.  Lab Results   Component Value Date    HGBA1C 6.3 (H) 2024     Orders:  -     TSH, 3rd generation with Free T4 reflex; Future  -     CBC and differential; Future  -     Comprehensive metabolic panel; Future  -     Hemoglobin A1C; Future  -     Albumin / creatinine urine ratio; Future  4. Essential hypertension  Assessment & Plan:  Well-controlled therefore to continue same medications as well as monitoring and to call if greater than 130/80.  I also encouraged his continued low-salt diet.  5. Mixed hyperlipidemia  Assessment & Plan:  Last labs revealed markedly elevated triglyceride hopefully improve now with diet changes.  " Will call patient with lab results and as to any further treatment adjustments needed  Orders:  -     Lipid panel; Future  6. Neuropathy  Assessment & Plan:  Questionably due to recent surgery with negative exam today very good circulation and without any prior complaints of back problems's symptoms improved with increased ambulation he will continue to monitor and call with any worsening.    Orders:  -     TSH, 3rd generation with Free T4 reflex; Future  -     Magnesium; Future  -     Vitamin B12; Future  7. Encounter for immunization  -     Pneumococcal Conjugate Vaccine 20-valent (Pcv20)  8. Screening for prostate cancer  -     PSA, Total Screen; Future  9. Abnormal LFTs  Assessment & Plan:  Decreased alcohol intake has been discussed with him in the past.  Need to monitor closely specially now that on statin agents.  Orders:  -     Comprehensive metabolic panel; Future  10. Idiopathic chronic gout without tophus, unspecified site  Assessment & Plan:  Symptoms are very well-controlled with current medications.  Orders:  -     Uric acid; Future  11. Vitamin D deficiency  -     Vitamin D 25 hydroxy; Future  12. Encounter for follow-up surveillance of colon cancer  -     Ambulatory Referral to Gastroenterology; Future    Immunizations and preventive care screenings were discussed with patient today. Appropriate education was printed on patient's after visit summary.        Counseling:  Alcohol/drug use: discussed moderation in alcohol intake, the recommendations for healthy alcohol use, and avoidance of illicit drug use.  Dental Health: discussed importance of regular tooth brushing, flossing, and dental visits.  Injury prevention: discussed safety/seat belts, safety helmets, smoke detectors, carbon dioxide detectors, and smoking near bedding or upholstery.  Sexual health: discussed sexually transmitted diseases, partner selection, use of condoms, avoidance of unintended pregnancy, and contraceptive  "alternatives.  Exercise: the importance of regular exercise/physical activity was discussed. Recommend exercise 3-5 times per week for at least 30 minutes.          History of Present Illness     Adult Annual Physical:  Patient presents for annual physical. This is my first visit with this patient since he underwent CABG x 4 in February of this year.  He has not completed cardiac rehab and is back to normal activities except for no lifting greater than 50 pounds due to his chest surgery.  He still gets occasional mid chest pain from the surgery but denies any chest pressure or shortness of breath or palpitations.  His last follow-up with cardiology was in April at which time he was told to discontinue his Plavix which she has not done.  He closely monitors his blood pressures, averaging 130/80.  He has markedly changed his diet dating he is now eating \"healthily\" and following a low-salt diet.  He still drinks 3-4 beers a day.  His mother  young apparently due to cardiac problems.  He knows of no other family members with cardiac history.    He was also diagnosed with diabetes and is taking metformin.  Apparently he had a 2-week trial of javi per Melissa but has not heard back from her regarding follow-up with her Endo insurance coverage of the javi.  He has not been monitoring his sugars.  He was seen by his ophthalmologist 3 weeks ago without any diabetic changes.  He has had intermittent numbness in his legs and feet since surgery for which arterial Dopplers were recently obtained -he did not hear any report about that.  Denies any chronic back pain and states the numbness has improved since he is ambulating more.  He was told the symptoms may be secondary to the surgery.    He has never had a colonoscopy but now is agreeable.  He denies any GI complaints and knows of no significant family history..     Diet and Physical Activity:  - Diet/Nutrition: well balanced diet.  - Exercise: walking.    General " "Health:  - Sleep: sleeps well.  - Hearing: decreased hearing bilateral ears.  - Vision: no vision problems.  - Dental: regular dental visits.     Health:  - History of STDs: no.   - Urinary symptoms: none.     Advanced Care Planning:  - Has an advanced directive?: no    - Has a durable medical POA?: no    - ACP document given to patient?: no      Review of Systems   Constitutional:  Negative for chills and fever.   HENT:  Negative for ear pain and sore throat.    Eyes:  Negative for pain and visual disturbance.   Respiratory:  Negative for cough, chest tightness and shortness of breath.    Cardiovascular:  Negative for chest pain, palpitations and leg swelling.   Gastrointestinal:  Negative for abdominal pain, blood in stool and vomiting.   Genitourinary:  Negative for dysuria and hematuria.   Musculoskeletal:  Negative for arthralgias and back pain.   Skin:  Negative for color change and rash.   Neurological:  Negative for seizures and syncope.   All other systems reviewed and are negative.        Objective     /80 (BP Location: Left arm, Patient Position: Sitting, Cuff Size: Adult)   Pulse 74   Temp (!) 97 °F (36.1 °C) (Tympanic)   Ht 5' 10\" (1.778 m)   Wt 82.4 kg (181 lb 9.6 oz)   SpO2 97%   BMI 26.06 kg/m²     Physical Exam  Vitals and nursing note reviewed.   Constitutional:       General: He is not in acute distress.     Appearance: Normal appearance. He is well-developed.   HENT:      Head: Normocephalic and atraumatic.   Eyes:      Conjunctiva/sclera: Conjunctivae normal.   Neck:      Vascular: No carotid bruit.   Cardiovascular:      Rate and Rhythm: Normal rate and regular rhythm.      Pulses: Normal pulses.           Dorsalis pedis pulses are 2+ on the right side and 2+ on the left side.        Posterior tibial pulses are 2+ on the right side and 2+ on the left side.      Heart sounds: Normal heart sounds. No murmur heard.  Pulmonary:      Effort: Pulmonary effort is normal. No respiratory " distress.      Breath sounds: Normal breath sounds.   Abdominal:      Tenderness: There is no abdominal tenderness.   Musculoskeletal:         General: No swelling.      Cervical back: Neck supple.      Right lower leg: No edema.      Left lower leg: No edema.   Feet:      Right foot:      Skin integrity: No ulcer, skin breakdown, erythema, warmth, callus or dry skin.      Left foot:      Skin integrity: No ulcer, skin breakdown, erythema, warmth, callus or dry skin.   Lymphadenopathy:      Cervical: No cervical adenopathy.   Skin:     General: Skin is warm and dry.      Capillary Refill: Capillary refill takes less than 2 seconds.   Neurological:      Mental Status: He is alert and oriented to person, place, and time.      Comments: Sensation today is intact over the entire aspects of lower extremities bilaterally.   All pulses great and recent arterial Dopplers reviewed and within normal limits which was reviewed with patient today.   Psychiatric:         Mood and Affect: Mood normal.         Behavior: Behavior normal.     Diabetic Foot Exam    Patient's shoes and socks removed.    Right Foot/Ankle   Right Foot Inspection  Skin Exam: skin normal and skin intact. No dry skin, no warmth, no callus, no erythema, no maceration, no abnormal color, no pre-ulcer, no ulcer and no callus.     Toe Exam: ROM and strength within normal limits.     Sensory   Monofilament testing: intact    Vascular  Capillary refills: < 3 seconds  The right DP pulse is 2+. The right PT pulse is 2+.     Left Foot/Ankle  Left Foot Inspection  Skin Exam: skin normal and skin intact. No dry skin, no warmth, no erythema, no maceration, normal color, no pre-ulcer, no ulcer and no callus.     Toe Exam: ROM and strength within normal limits.     Sensory   Monofilament testing: intact    Vascular  Capillary refills: < 3 seconds  The left DP pulse is 2+. The left PT pulse is 2+.

## 2024-08-01 NOTE — ASSESSMENT & PLAN NOTE
Decreased alcohol intake has been discussed with him in the past.  Need to monitor closely specially now that on statin agents.

## 2024-08-01 NOTE — ASSESSMENT & PLAN NOTE
He is to call me if he does not hear from Melissa within the next 1 to 2 weeks.  He is up-to-date with eye exams and foot exam performed today. Proper foot care discussed   routine labs ordered will await report from Melissa as to her continued care and how he will continue to monitor his sugars.  Lab Results   Component Value Date    HGBA1C 6.3 (H) 02/12/2024

## 2024-08-01 NOTE — ASSESSMENT & PLAN NOTE
Well-controlled therefore to continue same medications as well as monitoring and to call if greater than 130/80.  I also encouraged his continued low-salt diet.

## 2024-08-01 NOTE — ASSESSMENT & PLAN NOTE
"Doing quite well and back to normal activities.  He will continue same medications; I encouraged his continued good \"healthy\" diet; and will have follow-up with cardiology in the fall.  "

## 2024-08-04 DIAGNOSIS — K21.9 GASTROESOPHAGEAL REFLUX DISEASE, UNSPECIFIED WHETHER ESOPHAGITIS PRESENT: ICD-10-CM

## 2024-08-04 RX ORDER — LANSOPRAZOLE 30 MG/1
30 CAPSULE, DELAYED RELEASE ORAL DAILY
Qty: 90 CAPSULE | Refills: 1 | Status: SHIPPED | OUTPATIENT
Start: 2024-08-04

## 2024-08-08 DIAGNOSIS — E83.42 HYPOMAGNESEMIA: Primary | ICD-10-CM

## 2024-08-08 DIAGNOSIS — E53.8 VITAMIN B12 DEFICIENCY: ICD-10-CM

## 2024-08-08 RX ORDER — LANOLIN ALCOHOL/MO/W.PET/CERES
1000 CREAM (GRAM) TOPICAL DAILY
Qty: 90 TABLET | Refills: 3 | Status: SHIPPED | OUTPATIENT
Start: 2024-08-08

## 2024-08-08 RX ORDER — UREA 10 %
500 LOTION (ML) TOPICAL 2 TIMES DAILY
Qty: 60 TABLET | Refills: 3 | Status: SHIPPED | OUTPATIENT
Start: 2024-08-08

## 2024-08-14 ENCOUNTER — TELEPHONE (OUTPATIENT)
Age: 64
End: 2024-08-14

## 2024-08-14 NOTE — TELEPHONE ENCOUNTER
Patients GI provider:  Dr. MOYER     Number to return call: ([phone]    Reason for call: OV b4 COLON -- Cardiac event w/in 12 mo- open heart surgery    Scheduled procedure/appointment date if applicable: ApPT 9/16/24 08/14/24  Screened by: Fátima Cruz MA    Referring Provider     Pre- Screening:     There is no height or weight on file to calculate BMI. 26.06  Has patient been referred for a routine screening Colonoscopy? yes  Is the patient between 45-75 years old? yes      Previous Colonoscopy yes   If yes:    Date: 10 yrs    Facility:     Reason:       Does the patient want to see a Gastroenterologist prior to their procedure OR are they having any GI symptoms? no    Has the patient been hospitalized or had abdominal surgery in the past 6 months? no    Does the patient use supplemental oxygen? no    Does the patient take Coumadin, Lovenox, Plavix, Elliquis, Xarelto, or other blood thinning medication? no    Has the patient had a stroke, cardiac event, or stent placed in the past year? YES-  Open heart surgery in Feb 2024      If patient is between 45yrs - 49yrs, please advise patient that we will have to confirm benefits & coverage with their insurance company for a routine screening colonoscopy.

## 2024-08-15 DIAGNOSIS — E11.9 TYPE 2 DIABETES MELLITUS WITHOUT COMPLICATION, WITHOUT LONG-TERM CURRENT USE OF INSULIN (HCC): ICD-10-CM

## 2024-08-16 ENCOUNTER — TELEPHONE (OUTPATIENT)
Dept: ADMINISTRATIVE | Facility: OTHER | Age: 64
End: 2024-08-16

## 2024-08-16 NOTE — TELEPHONE ENCOUNTER
Upon review of the In Basket request and the patient's chart, initial outreach has been made via fax to facility. Please see Contacts section for details.     Thank you  Dianne Barclay MA

## 2024-08-16 NOTE — TELEPHONE ENCOUNTER
----- Message from Lillian FERMIN sent at 8/16/2024  8:42 AM EDT -----  Regarding: dm eye  08/16/24 8:42 AM    Leonardo, our patient Linden Flores has had Diabetic Eye Exam completed/performed. Please assist in updating the patient chart by making an External outreach to [facility]  [phone] facility located in . The date of service is 2024.    Thank you,  [facility]

## 2024-08-16 NOTE — LETTER
Diabetic Eye Exam Form    Date Requested: 24  Patient: Linden Flores  Patient : 1960   Referring Provider: Madison Vivar MD      DIABETIC Eye Exam Date _______________________________      Type of Exam MUST be documented for Diabetic Eye Exams. Please CHECK ONE.     Retinal Exam       Dilated Retinal Exam       OCT       Optomap-Iris Exam      Fundus Photography       Left Eye - Please check Retinopathy or No Retinopathy        Exam did show retinopathy    Exam did not show retinopathy       Right Eye - Please check Retinopathy or No Retinopathy       Exam did show retinopathy    Exam did not show retinopathy       Comments __________________________________________________________    Practice Providing Exam ______________________________________________    Exam Performed By (print name) _______________________________________      Provider Signature ___________________________________________________      These reports are needed for  compliance.  Please fax this completed form and a copy of the Diabetic Eye Exam report to our office located at [address] [place] [address] as soon as possible via Fax [phone] attention Dianne: Phone [phone]  We thank you for your assistance in treating our mutual patient.

## 2024-08-19 NOTE — TELEPHONE ENCOUNTER
Upon review of the In Basket request we were able to locate, review, and update the patient chart as requested for Diabetic Eye Exam.    Any additional questions or concerns should be emailed to the Practice Liaisons via the appropriate education email address, please do not reply via In Basket.    Thank you  Dianne Barclay MA   [facility]

## 2024-09-11 DIAGNOSIS — E78.5 HYPERLIPIDEMIA, UNSPECIFIED HYPERLIPIDEMIA TYPE: ICD-10-CM

## 2024-09-11 RX ORDER — ATORVASTATIN CALCIUM 20 MG/1
20 TABLET, FILM COATED ORAL DAILY
Qty: 90 TABLET | Refills: 1 | Status: SHIPPED | OUTPATIENT
Start: 2024-09-11

## 2024-09-16 ENCOUNTER — OFFICE VISIT (OUTPATIENT)
Dept: GASTROENTEROLOGY | Facility: CLINIC | Age: 64
End: 2024-09-16
Payer: COMMERCIAL

## 2024-09-16 VITALS
DIASTOLIC BLOOD PRESSURE: 72 MMHG | SYSTOLIC BLOOD PRESSURE: 120 MMHG | WEIGHT: 183.4 LBS | HEART RATE: 84 BPM | TEMPERATURE: 98.6 F | BODY MASS INDEX: 26.26 KG/M2 | HEIGHT: 70 IN | OXYGEN SATURATION: 95 %

## 2024-09-16 DIAGNOSIS — I25.810 CORONARY ARTERY DISEASE INVOLVING CORONARY BYPASS GRAFT OF NATIVE HEART WITHOUT ANGINA PECTORIS: ICD-10-CM

## 2024-09-16 DIAGNOSIS — Z08 ENCOUNTER FOR FOLLOW-UP SURVEILLANCE OF COLON CANCER: Primary | ICD-10-CM

## 2024-09-16 DIAGNOSIS — Z85.038 ENCOUNTER FOR FOLLOW-UP SURVEILLANCE OF COLON CANCER: Primary | ICD-10-CM

## 2024-09-16 DIAGNOSIS — K76.0 HEPATIC STEATOSIS: ICD-10-CM

## 2024-09-16 DIAGNOSIS — R79.89 ABNORMAL LFTS: ICD-10-CM

## 2024-09-16 DIAGNOSIS — K21.9 GASTROESOPHAGEAL REFLUX DISEASE, UNSPECIFIED WHETHER ESOPHAGITIS PRESENT: ICD-10-CM

## 2024-09-16 PROCEDURE — 99204 OFFICE O/P NEW MOD 45 MIN: CPT | Performed by: STUDENT IN AN ORGANIZED HEALTH CARE EDUCATION/TRAINING PROGRAM

## 2024-09-16 NOTE — ASSESSMENT & PLAN NOTE
Last colonoscopy in 2005 by Dr. Frazier.  At that time, no polyps were removed.  He had evidence of possible colitis but biopsies were negative.  He also had some diverticulosis.  He has been without any alarming lower GI symptoms.  No family history of colon cancer.  Patient is considered at average risk but is overdue for repeat screening colonoscopy.    In light of his recent MI in February, and the need for continuing DAPT after CABG, I have recommended that we hold off on scheduling colonoscopy  We will have the patient return in 6 months at which time we may reevaluate schedule him for colonoscopy as well as holding Plavix  I also offered alternative testing options including Cologuard although patient prefers colonoscopy

## 2024-09-16 NOTE — ASSESSMENT & PLAN NOTE
Had MI with CABG in February 2024.  Remains on DAPT.  Appears to have recovered fairly well.    Had extensive discussion with the patient regarding possible colonoscopy in the setting of his MI within the last year  I have recommended that we hold off scheduling colonoscopy for now as I would like him to have a full year of recovery following his MI and CABG as well as the need to continue DAPT  We will have the patient return to the office in 6 months, at which time we can discuss scheduling colonoscopy if Plavix can be safely held

## 2024-09-16 NOTE — PATIENT INSTRUCTIONS
We will hold off on scheduling colonoscopy for now  We will have you return in 6 months at which time we can discuss scheduling colonoscopy  We will check an elastography for your fatty liver

## 2024-09-16 NOTE — ASSESSMENT & PLAN NOTE
Evidence of hepatic steatosis on prior CT imaging.  Patient does admit to consuming beer on a daily basis.  Fortunately, without any laboratory or clinical stigmata of advanced liver disease.  He does have mild elevation of ALT on prior blood work.    Check elastography  I counseled the patient on importance of cutting down on alcohol consumption  Also recommended weight loss with increasing cardiovascular exercise and dietary changes

## 2024-09-16 NOTE — PROGRESS NOTES
[facility]  Outpatient Consultation  Encounter: [id number]     PATIENT INFO     Name: Linden Flores  YOB: 1960   Age: 64 y.o.   Sex: male   MRN: [id number]    ASSESSMENT & PLAN     Linden Flores is a 64 y.o. male with history of MI status post CABG on DAPT, fatty liver, hypertension, dyslipidemia who presents to GI office to discuss colonoscopy after recent MI.    Problem List Items Addressed This Visit       Gastroesophageal reflux disease     Reports history of reflux and remains on lansoprazole.  No alarming symptoms at this time.    We will consider repeat EGD at the time of colonoscopy for Hayes's screening as he may have a diagnosis of this  In the meantime, he may continue lansoprazole 30 mg daily         Abnormal LFTs    Coronary artery disease involving coronary bypass graft of native heart without angina pectoris     Had MI with CABG in February 2024.  Remains on DAPT.  Appears to have recovered fairly well.    Had extensive discussion with the patient regarding possible colonoscopy in the setting of his MI within the last year  I have recommended that we hold off scheduling colonoscopy for now as I would like him to have a full year of recovery following his MI and CABG as well as the need to continue DAPT  We will have the patient return to the office in 6 months, at which time we can discuss scheduling colonoscopy if Plavix can be safely held         Hepatic steatosis     Evidence of hepatic steatosis on prior CT imaging.  Patient does admit to consuming beer on a daily basis.  Fortunately, without any laboratory or clinical stigmata of advanced liver disease.  He does have mild elevation of ALT on prior blood work.    Check elastography  I counseled the patient on importance of cutting down on alcohol consumption  Also recommended weight loss with increasing cardiovascular exercise and dietary changes         Relevant Orders    US elastography/UGAP     Encounter for follow-up surveillance of colon cancer - Primary     Last colonoscopy in 2005 by Dr. Frazier.  At that time, no polyps were removed.  He had evidence of possible colitis but biopsies were negative.  He also had some diverticulosis.  He has been without any alarming lower GI symptoms.  No family history of colon cancer.  Patient is considered at average risk but is overdue for repeat screening colonoscopy.    In light of his recent MI in February, and the need for continuing DAPT after CABG, I have recommended that we hold off on scheduling colonoscopy  We will have the patient return in 6 months at which time we may reevaluate schedule him for colonoscopy as well as holding Plavix  I also offered alternative testing options including Cologuard although patient prefers colonoscopy          Orders Placed This Encounter   Procedures    US elastography/UGAP       FOLLOW-UP: 6 months    HISTORY OF PRESENT ILLNESS       Linden Flores is a 64 y.o. male who presents to GI office for colonoscopy for colon cancer screening in light of recent MI.  Patient is new to this office.  Has been referred by his PCP.  He has past medical history of hypertension, dyslipidemia, hepatic steatosis, MI status post CABG currently on DAPT, and GERD.    Patient reports that he had MI in February 2024 and underwent CABG at [facility].  He remains on DAPT.  Fortunately, it appears that he has recovered well from his MI.  He is doing well with no complaints of chest pain or shortness of breath.  He states that he is due for colonoscopy.  His last colonoscopy was in 2005 with findings as noted below.  He denies any family history of colon cancer.  He denies any alarming lower GI symptoms including abdominal pain, unintentional weight loss, change in bowel habits, diarrhea, constipation, hematochezia, melena.    CT imaging in April did show evidence of possible thickening in the colon suggestive of possible segmental colitis.  He  does not appear to have any symptoms correlating to this.  There was also evidence of hepatic steatosis.  Patient does admit to consuming beer on a daily basis.     ENDOSCOPIC HISTORY     UPPER ENDOSCOPY: Several years ago at outside facility; report not available for review    COLONOSCOPY: 2005 at outside facility with reported colitis (biopsies negative), diverticulosis, otherwise unremarkable    REVIEW OF SYSTEMS     CONSTITUTIONAL: Denies any fever, chills, rigors, and weight loss  HEENT: No earache or tinnitus, denies hearing loss or visual disturbances  CARDIOVASCULAR: No chest pain or palpitations  RESPIRATORY: Denies any cough, hemoptysis, shortness of breath or dyspnea on exertion  GASTROINTESTINAL: As noted in the History of Present Illness  GENITOURINARY: No problems with urination, denies any hematuria or dysuria  NEUROLOGIC: No dizziness or vertigo, denies headaches   MUSCULOSKELETAL: Denies any muscle or joint pain   SKIN: Denies jaundice or itching  PSYCHOSOCIAL: Denies depression or anxiety, denies any recent memory loss     Historical Information   Past Medical History:   Diagnosis Date    High cholesterol     Hypertension      History reviewed. No pertinent surgical history.  Social History   Social History     Substance and Sexual Activity   Alcohol Use Yes    Comment: social     Social History     Substance and Sexual Activity   Drug Use Never     Social History     Tobacco Use   Smoking Status Never   Smokeless Tobacco Never     History reviewed. No pertinent family history.    MEDICATIONS & ALLERGIES     Current Outpatient Medications   Medication Instructions    allopurinol (ZYLOPRIM) 300 mg, Oral, Daily    amLODIPine (NORVASC) 5 mg, Oral, Daily    aspirin 81 mg, Oral, Daily    atorvastatin (LIPITOR) 20 mg, Oral, Daily    clopidogrel (PLAVIX) 75 mg, Oral, Daily    lansoprazole (PREVACID) 30 mg, Oral, Daily    magnesium gluconate (MAGONATE) 500 mg, Oral, 2 times daily    metFORMIN (GLUCOPHAGE)  "500 mg, Oral, 2 times daily with meals    metoprolol succinate (TOPROL-XL) 50 mg 24 hr tablet take 1 tablet by mouth daily for high blood pressure    rosuvastatin (CRESTOR) 40 mg, Oral    vitamin B-12 (VITAMIN B-12) 1,000 mcg, Oral, Daily     No Known Allergies    PHYSICAL EXAM      Objective   Blood pressure 120/72, pulse 84, temperature 98.6 °F (37 °C), temperature source Temporal, height 5' 10\" (1.778 m), weight 83.2 kg (183 lb 6.4 oz), SpO2 95%. Body mass index is 26.32 kg/m².    General Appearance:   Alert, cooperative, no distress   HEENT:   Normocephalic, atraumatic, anicteric     Neck:   Supple, symmetrical, trachea midline   Lungs:   Equal chest rise, respirations unlabored    Heart:   Regular rate   Abdomen:   Soft, non-tender, non-distended; normal bowel sounds; no masses, no organomegaly    Rectal:   Deferred    Extremities:   No cyanosis or edema    Neuro:   Moves all 4 extremities    Skin:   No jaundice, rashes, or lesions       LABORATORY RESULTS     No visits with results within 1 Day(s) from this visit.   Latest known visit with results is:   Telephone on 08/16/2024   Component Date Value    Severity 06/10/2024 Normal     Right Eye Diabetic Retin* 06/10/2024 None     Left Eye Diabetic Retino* 06/10/2024 None         IMAGING RESULTS     No results found.  I have personally reviewed any available and pertinent imaging study reports.      Jj Toribio D.O.  [facility]  Division of Gastroenterology & Hepatology  Available on [employer]  [email or web address]    ** Please Note: This note is constructed using a voice recognition dictation system. **  "

## 2024-09-16 NOTE — ASSESSMENT & PLAN NOTE
Reports history of reflux and remains on lansoprazole.  No alarming symptoms at this time.    We will consider repeat EGD at the time of colonoscopy for Hayes's screening as he may have a diagnosis of this  In the meantime, he may continue lansoprazole 30 mg daily

## 2024-09-23 ENCOUNTER — HOSPITAL ENCOUNTER (OUTPATIENT)
Dept: ULTRASOUND IMAGING | Facility: HOSPITAL | Age: 64
Discharge: HOME | End: 2024-09-23
Attending: STUDENT IN AN ORGANIZED HEALTH CARE EDUCATION/TRAINING PROGRAM
Payer: COMMERCIAL

## 2024-09-23 DIAGNOSIS — K76.0 HEPATIC STEATOSIS: ICD-10-CM

## 2024-09-23 PROCEDURE — 76981 USE PARENCHYMA: CPT

## 2024-10-01 ENCOUNTER — TELEPHONE (OUTPATIENT)
Dept: GASTROENTEROLOGY | Facility: CLINIC | Age: 64
End: 2024-10-01

## 2024-10-01 NOTE — TELEPHONE ENCOUNTER
----- Message from Jj Toribio DO sent at 10/1/2024  1:21 PM EDT -----  Hi,    Please inform patient.    Elastography shows no scarring in the liver.  This is good news.  He does have significant fat within the liver.    The best treatment for fatty liver is weight loss and avoidance of alcohol.  We can consider repeating the elastography in 1 year as well.    He should follow-up in the office with me.    Thank you.    Jj Toriboi D.O.  [facility]  Division of Gastroenterology & Hepatology  Available on [email or web address]  [email or web address]

## 2024-11-18 ENCOUNTER — RA CDI HCC (OUTPATIENT)
Dept: OTHER | Facility: HOSPITAL | Age: 64
End: 2024-11-18

## 2024-11-25 ENCOUNTER — OFFICE VISIT (OUTPATIENT)
Dept: FAMILY MEDICINE CLINIC | Facility: CLINIC | Age: 64
End: 2024-11-25
Payer: COMMERCIAL

## 2024-11-25 VITALS
SYSTOLIC BLOOD PRESSURE: 128 MMHG | HEIGHT: 70 IN | DIASTOLIC BLOOD PRESSURE: 68 MMHG | OXYGEN SATURATION: 99 % | WEIGHT: 182.6 LBS | BODY MASS INDEX: 26.14 KG/M2 | HEART RATE: 65 BPM | TEMPERATURE: 97.1 F

## 2024-11-25 DIAGNOSIS — I25.810 CORONARY ARTERY DISEASE INVOLVING CORONARY BYPASS GRAFT OF NATIVE HEART WITHOUT ANGINA PECTORIS: ICD-10-CM

## 2024-11-25 DIAGNOSIS — Z23 ENCOUNTER FOR IMMUNIZATION: ICD-10-CM

## 2024-11-25 DIAGNOSIS — K76.0 HEPATIC STEATOSIS: ICD-10-CM

## 2024-11-25 DIAGNOSIS — Z12.11 SCREENING FOR COLON CANCER: ICD-10-CM

## 2024-11-25 DIAGNOSIS — R79.89 ABNORMAL LFTS: ICD-10-CM

## 2024-11-25 DIAGNOSIS — E78.2 MIXED HYPERLIPIDEMIA: ICD-10-CM

## 2024-11-25 DIAGNOSIS — E11.9 TYPE 2 DIABETES MELLITUS WITHOUT COMPLICATION, WITHOUT LONG-TERM CURRENT USE OF INSULIN (HCC): Primary | ICD-10-CM

## 2024-11-25 DIAGNOSIS — I10 ESSENTIAL HYPERTENSION: ICD-10-CM

## 2024-11-25 PROBLEM — E11.65 TYPE 2 DIABETES MELLITUS WITH HYPERGLYCEMIA, WITHOUT LONG-TERM CURRENT USE OF INSULIN (HCC): Status: ACTIVE | Noted: 2024-02-14

## 2024-11-25 PROBLEM — Z95.1 S/P CABG X 4: Status: ACTIVE | Noted: 2024-02-21

## 2024-11-25 PROBLEM — I34.0 MITRAL REGURGITATION: Status: ACTIVE | Noted: 2024-02-21

## 2024-11-25 LAB — SL AMB POCT HEMOGLOBIN AIC: 6.2 (ref ?–6.5)

## 2024-11-25 PROCEDURE — 83036 HEMOGLOBIN GLYCOSYLATED A1C: CPT | Performed by: FAMILY MEDICINE

## 2024-11-25 PROCEDURE — 99214 OFFICE O/P EST MOD 30 MIN: CPT | Performed by: FAMILY MEDICINE

## 2024-11-25 RX ORDER — OMEPRAZOLE 20 MG/1
20 TABLET, DELAYED RELEASE ORAL DAILY
COMMUNITY

## 2024-11-25 NOTE — ASSESSMENT & PLAN NOTE
Well-controlled therefore to continue same medications as well as low-salt diet and home monitoring and to call if greater than 130/80

## 2024-11-25 NOTE — PROGRESS NOTES
"Name: Linden Flores      : 1960      MRN: [id number]  Encounter Provider: Madison Vivar MD  Encounter Date: 2024   Encounter department: [facility] PRIMARY [facility]  :  Assessment & Plan  Coronary artery disease involving coronary bypass graft of native heart without angina pectoris  Still with occasional dyspnea on exertion especially with overactivity but states this is less frequent since he is becoming more active and coming \"conditioning\".  He denies any chest pain, palpitations, or edema.  He does follow-up routinely with cardiology knows to call if with any other symptoms.       Essential hypertension  Well-controlled therefore to continue same medications as well as low-salt diet and home monitoring and to call if greater than 130/80       Type 2 diabetes mellitus without complication, without long-term current use of insulin ([facility])    Lab Results   Component Value Date    HGBA1C 6.6 (H) 2024   Hemoglobin A1c today 6.2 which is markedly improved therefore to continue same medications and diet.  I did encourage follow-up with Melissa he is up-to-date with eye exams.  Footcare discussed today    Orders:    POCT hemoglobin A1c    Mixed hyperlipidemia  As per cardiology Ideal LDL less than 70 -last labs 76 therefore to continue same medications but hopefully further restricted diet       Hepatic steatosis  Again encouraged limited alcohol and low-fat diet discussed.  He does follow-up with GI       Abnormal LFTs  As above       Screening for colon cancer  GI planning for colonoscopy a year out from his cardiac event       Encounter for immunization  Patient refuses annual flu shot and not interested in any further COVID vaccines.  He plans to get his Shingrix booster at which time he will be up-to-date with other immunizations.  He will follow-up in 3 to 4 months with Dr. Lyles              History of Present Illness     Patient is feeling well.  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  He recently saw cardiology without any change in treatment.  Also had follow-up with GI and decided to wait a whole year from his cardiac event before performing colonoscopy.  He did have an EGD approximately 1 year ago which apparently was fine though with a history of Hayes's.  He denies any current GI symptomatology.  He did undergo ultrasound of liver revealing fatty liver but without any scarring which is important given his past hx of ETOH use!.  He still admits to at least several beers over the weekend but not during the week.  He finally obtained javi system from [employer] but has not had recent follow-up with her.  He states he has changed his diet avoiding sweets and cutting back on lunch meats and red meat.  He follows up with ophthalmology in [place] without diabetic changes.  He notes occasionally tingling feeling of his feet but not keeping him awake.  He does not walk barefoot.    Home blood pressure readings average 120-130/80.  Home sugar monitoring usually 1 30-1 50 irregardless of time of day      Review of Systems       Objective   /68 (BP Location: Left arm, Patient Position: Sitting, Cuff Size: Large)   Pulse 65   Temp (!) 97.1 °F (36.2 °C) (Tympanic)   Ht 5' 10\" (1.778 m)   Wt 82.8 kg (182 lb 9.6 oz)   SpO2 99%   BMI 26.20 kg/m²      Physical Exam  Vitals and nursing note reviewed.   Constitutional:       General: He is not in acute distress.     Appearance: Normal appearance. He is well-developed.   HENT:      Head: Normocephalic and atraumatic.   Eyes:      Conjunctiva/sclera: Conjunctivae normal.   Neck:      Vascular: No carotid bruit.   Cardiovascular:      Rate and Rhythm: Normal rate and regular rhythm.      Pulses: Normal pulses. no weak pulses.           Dorsalis pedis pulses are 2+ on the right side and 2+ on the left side.        Posterior tibial pulses are 2+ on the right side and 2+ on the left side.      Heart sounds: Normal " heart sounds. No murmur heard.  Pulmonary:      Effort: Pulmonary effort is normal. No respiratory distress.      Breath sounds: Normal breath sounds.   Abdominal:      Palpations: Abdomen is soft.      Tenderness: There is no abdominal tenderness.   Musculoskeletal:         General: No swelling.      Cervical back: Neck supple.      Right lower leg: No edema.      Left lower leg: No edema.   Feet:      Right foot:      Skin integrity: No ulcer, skin breakdown, erythema, warmth, callus or dry skin.      Left foot:      Skin integrity: No ulcer, skin breakdown, erythema, warmth, callus or dry skin.   Lymphadenopathy:      Cervical: No cervical adenopathy.   Skin:     General: Skin is warm and dry.      Capillary Refill: Capillary refill takes less than 2 seconds.   Neurological:      Mental Status: He is alert and oriented to person, place, and time.   Psychiatric:         Mood and Affect: Mood normal.     Diabetic Foot Exam    Patient's shoes and socks removed.    Right Foot/Ankle   Right Foot Inspection  Skin Exam: skin normal and skin intact. No dry skin, no warmth, no callus, no erythema, no maceration, no abnormal color, no pre-ulcer, no ulcer and no callus.     Toe Exam: ROM and strength within normal limits.     Sensory   Monofilament testing: intact    Vascular  Capillary refills: < 3 seconds  The right DP pulse is 2+. The right PT pulse is 2+.     Left Foot/Ankle  Left Foot Inspection  Skin Exam: skin normal and skin intact. No dry skin, no warmth, no erythema, no maceration, normal color, no pre-ulcer, no ulcer and no callus.     Toe Exam: ROM and strength within normal limits.     Sensory   Monofilament testing: intact    Vascular  Capillary refills: < 3 seconds  The left DP pulse is 2+. The left PT pulse is 2+.     Assign Risk Category  No deformity present  No loss of protective sensation  No weak pulses  Risk: 0

## 2024-11-25 NOTE — ASSESSMENT & PLAN NOTE
As per cardiology Ideal LDL less than 70 -last labs 76 therefore to continue same medications but hopefully further restricted diet

## 2024-11-25 NOTE — ASSESSMENT & PLAN NOTE
Patient refuses annual flu shot and not interested in any further COVID vaccines.  He plans to get his Shingrix booster at which time he will be up-to-date with other immunizations.  He will follow-up in 3 to 4 months with Dr. Lyles

## 2024-11-25 NOTE — ASSESSMENT & PLAN NOTE
"Still with occasional dyspnea on exertion especially with overactivity but states this is less frequent since he is becoming more active and coming \"conditioning\".  He denies any chest pain, palpitations, or edema.  He does follow-up routinely with cardiology knows to call if with any other symptoms.       "

## 2024-11-25 NOTE — ASSESSMENT & PLAN NOTE
Lab Results   Component Value Date    HGBA1C 6.6 (H) 08/01/2024   Hemoglobin A1c today 6.2 which is markedly improved therefore to continue same medications and diet.  I did encourage follow-up with Melissa he is up-to-date with eye exams.  Footcare discussed today    Orders:    POCT hemoglobin A1c

## 2024-12-11 ENCOUNTER — TELEPHONE (OUTPATIENT)
Dept: FAMILY MEDICINE CLINIC | Facility: CLINIC | Age: 64
End: 2024-12-11

## 2024-12-11 NOTE — TELEPHONE ENCOUNTER
Called patient to verify if he is taking Atorvastatin and Rosuvastatin and if so what strengths.  Patient states he will call back when he gets home to verify the strengths.

## 2024-12-12 ENCOUNTER — VBI (OUTPATIENT)
Dept: ADMINISTRATIVE | Facility: OTHER | Age: 64
End: 2024-12-12

## 2024-12-12 NOTE — TELEPHONE ENCOUNTER
12/12/24 4:39 PM     Chart reviewed for Diabetic Eye Exam was/were submitted to the patient's insurance.     Marci Castillo MA   [facility]

## 2024-12-18 NOTE — TELEPHONE ENCOUNTER
Called the cardiologist office and they stated they would send a note back to [place] Lucero to clarify these two medications and would give us a call back.

## 2024-12-19 NOTE — TELEPHONE ENCOUNTER
Adriana, Nurse, [facility], reports Linden should be taking rosuvastatin 40 mg daily.

## 2024-12-25 PROBLEM — Z12.11 SCREENING FOR COLON CANCER: Status: RESOLVED | Noted: 2024-11-25 | Resolved: 2024-12-25

## 2025-02-24 ENCOUNTER — OFFICE VISIT (OUTPATIENT)
Dept: FAMILY MEDICINE CLINIC | Facility: CLINIC | Age: 65
End: 2025-02-24
Payer: COMMERCIAL

## 2025-02-24 VITALS
HEART RATE: 69 BPM | OXYGEN SATURATION: 97 % | DIASTOLIC BLOOD PRESSURE: 74 MMHG | SYSTOLIC BLOOD PRESSURE: 128 MMHG | BODY MASS INDEX: 26.48 KG/M2 | WEIGHT: 185 LBS | HEIGHT: 70 IN

## 2025-02-24 DIAGNOSIS — K22.70 BARRETT'S ESOPHAGUS WITHOUT DYSPLASIA: Primary | ICD-10-CM

## 2025-02-24 DIAGNOSIS — I10 ESSENTIAL HYPERTENSION: ICD-10-CM

## 2025-02-24 DIAGNOSIS — E11.9 TYPE 2 DIABETES MELLITUS WITHOUT COMPLICATION, WITHOUT LONG-TERM CURRENT USE OF INSULIN (HCC): ICD-10-CM

## 2025-02-24 DIAGNOSIS — I25.810 CORONARY ARTERY DISEASE INVOLVING CORONARY BYPASS GRAFT OF NATIVE HEART WITHOUT ANGINA PECTORIS: ICD-10-CM

## 2025-02-24 DIAGNOSIS — G47.30 SLEEP APNEA, UNSPECIFIED TYPE: ICD-10-CM

## 2025-02-24 LAB — SL AMB POCT HEMOGLOBIN AIC: 6.4 (ref ?–6.5)

## 2025-02-24 PROCEDURE — 83036 HEMOGLOBIN GLYCOSYLATED A1C: CPT | Performed by: STUDENT IN AN ORGANIZED HEALTH CARE EDUCATION/TRAINING PROGRAM

## 2025-02-24 PROCEDURE — 99214 OFFICE O/P EST MOD 30 MIN: CPT | Performed by: STUDENT IN AN ORGANIZED HEALTH CARE EDUCATION/TRAINING PROGRAM

## 2025-02-24 NOTE — ASSESSMENT & PLAN NOTE
Lab Results   Component Value Date    HGBA1C 6.2 11/25/2024     On a javi 3  On metformin 500mg BID  Hba1c 6.4  UTD on eye and foot exam   Was seeing joseph, plans to switch to PCP for management   On crestor    Orders:    POCT hemoglobin A1c    Comprehensive metabolic panel; Future    Lipid Panel with Direct LDL reflex; Future    Hemoglobin A1C; Future    TSH, 3rd generation with Free T4 reflex; Future    CBC and differential; Future

## 2025-02-24 NOTE — PROGRESS NOTES
"Name: Linden Flores      : 1960      MRN: [id number]  Encounter Provider: Linden Lyles MD  Encounter Date: 2025   Encounter department: [facility]  :  Assessment & Plan  Hayes's esophagus without dysplasia  Follows with GI   Had an EGD about a year ago  On PPI  Orders:    Ambulatory Referral to Gastroenterology; Future    Type 2 diabetes mellitus without complication, without long-term current use of insulin (HCC)    Lab Results   Component Value Date    HGBA1C 6.2 2024     On a javi 3  On metformin 500mg BID  Hba1c 6.4  UTD on eye and foot exam   Was seeing joseph, plans to switch to PCP for management   On crestor    Orders:    POCT hemoglobin A1c    Comprehensive metabolic panel; Future    Lipid Panel with Direct LDL reflex; Future    Hemoglobin A1C; Future    TSH, 3rd generation with Free T4 reflex; Future    CBC and differential; Future    Coronary artery disease involving coronary bypass graft of native heart without angina pectoris  Had a CABG 2024  Follows with Cardiology - [facility]   On toprol, ASA, Crestor  Notes reviewed       Orders:    Comprehensive metabolic panel; Future    Lipid Panel with Direct LDL reflex; Future    Hemoglobin A1C; Future    TSH, 3rd generation with Free T4 reflex; Future    CBC and differential; Future    Essential hypertension  Well controlled   Continue with amlodipine            Sleep apnea, unspecified type  Not using CPAP   Reports having a \"operation\" years ago to help with this                History of Present Illness   HPI    65-year-old male presents the office today as transfer of care.  Patient previously seen Dr. Vivar who recently retired.  Presents today to discuss his chronic medical issues.  He has no acute concerns.  Please see above porch documentation for further detail.        Review of Systems   Constitutional:  Negative for activity change, appetite change, chills, fatigue and fever.   HENT:  Negative " "for congestion, dental problem, drooling, ear discharge, ear pain, facial swelling, postnasal drip, rhinorrhea and sinus pain.    Eyes:  Negative for photophobia, pain, discharge and itching.   Respiratory:  Negative for apnea, cough, chest tightness and shortness of breath.    Cardiovascular:  Negative for chest pain and leg swelling.   Gastrointestinal:  Negative for abdominal distention, abdominal pain, anal bleeding, constipation, diarrhea and nausea.   Endocrine: Negative for cold intolerance, heat intolerance and polydipsia.   Genitourinary:  Negative for difficulty urinating.   Musculoskeletal:  Negative for arthralgias, gait problem, joint swelling and myalgias.   Skin:  Negative for color change and pallor.   Allergic/Immunologic: Negative for immunocompromised state.   Neurological:  Negative for dizziness, seizures, facial asymmetry, weakness, light-headedness, numbness and headaches.   Psychiatric/Behavioral:  Negative for agitation, behavioral problems, confusion, decreased concentration and dysphoric mood.    All other systems reviewed and are negative.      Objective   /74 (BP Location: Left arm, Patient Position: Sitting, Cuff Size: Adult)   Pulse 69   Ht 5' 10\" (1.778 m)   Wt 83.9 kg (185 lb)   SpO2 97%   BMI 26.54 kg/m²      Physical Exam  Constitutional:       Appearance: He is well-developed.   HENT:      Head: Normocephalic.   Eyes:      Pupils: Pupils are equal, round, and reactive to light.   Cardiovascular:      Rate and Rhythm: Normal rate and regular rhythm.   Pulmonary:      Effort: Pulmonary effort is normal.      Breath sounds: Normal breath sounds.   Abdominal:      General: Bowel sounds are normal.      Palpations: Abdomen is soft.   Musculoskeletal:         General: Normal range of motion.      Cervical back: Normal range of motion and neck supple.   Skin:     General: Skin is warm.         "

## 2025-02-24 NOTE — ASSESSMENT & PLAN NOTE
Follows with GI   Had an EGD about a year ago  On PPI  Orders:    Ambulatory Referral to Gastroenterology; Future

## 2025-02-24 NOTE — ASSESSMENT & PLAN NOTE
Had a CABG feb 14th 2024  Follows with Cardiology - [facility]   On toprol, ASA, Crestor  Notes reviewed       Orders:    Comprehensive metabolic panel; Future    Lipid Panel with Direct LDL reflex; Future    Hemoglobin A1C; Future    TSH, 3rd generation with Free T4 reflex; Future    CBC and differential; Future

## 2025-03-07 ENCOUNTER — PATIENT MESSAGE (OUTPATIENT)
Dept: FAMILY MEDICINE CLINIC | Facility: CLINIC | Age: 65
End: 2025-03-07

## 2025-03-15 DIAGNOSIS — E78.5 HYPERLIPIDEMIA, UNSPECIFIED HYPERLIPIDEMIA TYPE: ICD-10-CM

## 2025-03-16 RX ORDER — ATORVASTATIN CALCIUM 20 MG/1
20 TABLET, FILM COATED ORAL DAILY
Qty: 90 TABLET | Refills: 1 | Status: SHIPPED | OUTPATIENT
Start: 2025-03-16

## 2025-03-22 DIAGNOSIS — E11.9 TYPE 2 DIABETES MELLITUS WITHOUT COMPLICATION, WITHOUT LONG-TERM CURRENT USE OF INSULIN (HCC): ICD-10-CM

## 2025-06-03 ENCOUNTER — APPOINTMENT (OUTPATIENT)
Dept: RADIOLOGY | Facility: CLINIC | Age: 65
End: 2025-06-03
Attending: ORTHOPAEDIC SURGERY
Payer: COMMERCIAL

## 2025-06-03 VITALS — HEIGHT: 70 IN | WEIGHT: 182 LBS | BODY MASS INDEX: 26.05 KG/M2

## 2025-06-03 DIAGNOSIS — M25.562 LEFT KNEE PAIN, UNSPECIFIED CHRONICITY: ICD-10-CM

## 2025-06-03 DIAGNOSIS — M17.12 PRIMARY OSTEOARTHRITIS OF LEFT KNEE: Primary | ICD-10-CM

## 2025-06-03 DIAGNOSIS — Z01.812 PRE-OPERATIVE LABORATORY EXAMINATION: ICD-10-CM

## 2025-06-03 PROCEDURE — 73562 X-RAY EXAM OF KNEE 3: CPT

## 2025-06-03 PROCEDURE — 99213 OFFICE O/P EST LOW 20 MIN: CPT | Performed by: ORTHOPAEDIC SURGERY

## 2025-06-03 RX ORDER — AMLODIPINE BESYLATE 5 MG/1
1 TABLET ORAL DAILY
COMMUNITY
Start: 2025-03-17

## 2025-06-03 RX ORDER — ASCORBIC ACID 500 MG
500 TABLET ORAL 2 TIMES DAILY
Qty: 60 TABLET | Refills: 1 | Status: SHIPPED | OUTPATIENT
Start: 2025-06-03 | End: 2025-08-02

## 2025-06-03 RX ORDER — CHLORHEXIDINE GLUCONATE 40 MG/ML
SOLUTION TOPICAL DAILY PRN
OUTPATIENT
Start: 2025-06-03

## 2025-06-03 RX ORDER — CHLORHEXIDINE GLUCONATE ORAL RINSE 1.2 MG/ML
15 SOLUTION DENTAL ONCE
OUTPATIENT
Start: 2025-06-03 | End: 2025-06-03

## 2025-06-03 RX ORDER — SODIUM CHLORIDE, SODIUM LACTATE, POTASSIUM CHLORIDE, CALCIUM CHLORIDE 600; 310; 30; 20 MG/100ML; MG/100ML; MG/100ML; MG/100ML
20 INJECTION, SOLUTION INTRAVENOUS CONTINUOUS
OUTPATIENT
Start: 2025-06-03

## 2025-06-03 RX ORDER — FOLIC ACID 1 MG/1
1 TABLET ORAL DAILY
Qty: 30 TABLET | Refills: 1 | Status: SHIPPED | OUTPATIENT
Start: 2025-06-03 | End: 2025-08-02

## 2025-06-03 RX ORDER — CEFAZOLIN SODIUM 2 G/50ML
2000 SOLUTION INTRAVENOUS ONCE
OUTPATIENT
Start: 2025-06-03 | End: 2025-06-03

## 2025-06-03 RX ORDER — FERROUS SULFATE 324(65)MG
324 TABLET, DELAYED RELEASE (ENTERIC COATED) ORAL
Qty: 60 TABLET | Refills: 1 | Status: SHIPPED | OUTPATIENT
Start: 2025-06-03 | End: 2025-08-02

## 2025-06-03 RX ORDER — MULTIVIT-MIN/IRON FUM/FOLIC AC 7.5 MG-4
1 TABLET ORAL DAILY
Qty: 30 TABLET | Refills: 1 | Status: SHIPPED | OUTPATIENT
Start: 2025-06-03

## 2025-06-03 RX ORDER — TRANEXAMIC ACID 10 MG/ML
1000 INJECTION, SOLUTION INTRAVENOUS ONCE
OUTPATIENT
Start: 2025-06-03 | End: 2025-06-03

## 2025-06-03 RX ORDER — MUPIROCIN 20 MG/G
OINTMENT TOPICAL
Qty: 15 G | Refills: 1 | Status: SHIPPED | OUTPATIENT
Start: 2025-06-03

## 2025-06-03 NOTE — PROGRESS NOTES
Assessment & Plan  Left knee pain, unspecified chronicity    Orders:    XR knee 3 vw left non injury; Future    Primary osteoarthritis of left knee  X-rays of the patient's left knee are consistent with advanced arthritic changes.  The patient states that his symptoms are continued to worsen starting to affect his quality of life.  After exhausting conservative treatment, the risks and benefits of surgery were discussed with the patient.  He decided on an elective left total knee replacement.  The patient's surgery is tentatively scheduled for September 10, 2025.  He is acceptable to this plan.  Orders:    [facility] to [facility]; Future    Ambulatory referral to Physical Therapy; Future    Case request operating room: ARTHROPLASTY KNEE TOTAL; Standing    mupirocin (BACTROBAN) 2 % ointment; Apply topically to the inside of the left and right nostrils twice daily for 5 days before surgery, including the morning of surgery.    ascorbic acid (VITAMIN C) 500 MG tablet; Take 1 tablet (500 mg total) by mouth 2 (two) times a day    ferrous sulfate 324 (65 Fe) mg; Take 1 tablet (324 mg total) by mouth 2 (two) times a day before meals    folic acid (FOLVITE) 1 mg tablet; Take 1 tablet (1 mg total) by mouth daily    Multiple Vitamins-Minerals (multivitamin with minerals) tablet; Take 1 tablet by mouth daily    Pre-operative laboratory examination    Orders:    Comprehensive metabolic panel; Future    Hemoglobin A1C W/EAG Estimation; Future    CBC and differential; Future    MRSA culture; Future    Anemia Panel w/Reflex; Future    Protime-INR; Future    APTT; Future    EKG 12 lead; Future    Type and screen; Future          The patient has advanced arthritic changes of his left knee.  Treatment options were discussed.  He is opted for elective left total knee replacement.  All risk, complications, and benefits were discussed with the patient in great detail including bleeding, infection, blood  clots, pain, stiffness, neurovascular damage, fractures, dislocations, the possible loss of life from surgery, heart attack, stroke, wound problems, etc.  Surgery scheduled for September 10th, 2025    No follow-ups on file.      _____________________________________________________  CHIEF COMPLAINT:  Chief Complaint   Patient presents with    Left Knee - Pain         SUBJECTIVE:  Linden Flores is a 65 y.o. male who presents to our office complaining of left knee pain.  The patient states that his pain is worsened with prolonged ambulation, activity and standing.  He states that his symptoms are worsening.  He has tried corticosteroid injections in the past which have not provided him with relief of his symptoms.  He denies any numbness or tingling.  He denies any fever or chills.    The following portions of the patient's history were reviewed and updated as appropriate: allergies, current medications, past family history, past medical history, past social history, past surgical history and problem list.    PAST MEDICAL HISTORY:  Past Medical History[1]    PAST SURGICAL HISTORY:  Past Surgical History[2]    FAMILY HISTORY:  Family History[3]    SOCIAL HISTORY:  Social History[4]    MEDICATIONS:  Current Medications[5]    ALLERGIES:  Allergies[6]    ROS:  Review of Systems     Constitutional: Negative for fatigue, fever or loss of appetite.   HENT: Negative.    Respiratory: Negative for shortness of breath, dyspnea.    Cardiovascular: Negative for chest pain/tightness.   Gastrointestinal: Negative for abdominal pain, N/V.   Endocrine: Negative for cold/heat intolerance, unexplained weight loss/gain.   Genitourinary: Negative for flank pain, dysuria, hematuria.   Musculoskeletal: Positive for arthralgia   Skin: Negative for rash.    Neurological: Negative for numbness or tingling  Psychiatric/Behavioral: Negative for agitation.  _____________________________________________________  PHYSICAL EXAMINATION:    Height 5'  "10\" (1.778 m), weight 82.6 kg (182 lb).    Constitutional: Oriented to person, place, and time. Appears well-developed and well-nourished. No distress.   HENT:   Head: Normocephalic.   Eyes: Conjunctivae are normal. Right eye exhibits no discharge. Left eye exhibits no discharge. No scleral icterus.   Cardiovascular: Normal rate.    Pulmonary/Chest: Effort normal.   Neurological: Alert and oriented to person, place, and time.   Skin: Skin is warm and dry. No rash noted. Not diaphoretic. No erythema. No pallor.   Psychiatric: Normal mood and affect. Behavior is normal. Judgment and thought content normal.      MUSCULOSKELETAL EXAMINATION:   Physical Exam  Ortho Exam    Left lower extremity is neurovascularly intact  Toes are pink and mobile   Compartments are soft  No warmth, erythema or ecchymosis  ROM of knee is from 5-115 degrees  Negative Lachman, drawer or pivot shift  No medial instability  Medial joint line tenderness, slight lateral joint line tenderness  Patellofemoral crepitation   Cardiovascular: Normal rate    Pulmonary: Effort normal  Abdomen: Soft, nontender, nondistended    Objective:  BP Readings from Last 1 Encounters:   02/24/25 128/74      Wt Readings from Last 1 Encounters:   06/03/25 82.6 kg (182 lb)        BMI:   Estimated body mass index is 26.11 kg/m² as calculated from the following:    Height as of this encounter: 5' 10\" (1.778 m).    Weight as of this encounter: 82.6 kg (182 lb).        Scribe Attestation      I,:   am acting as a scribe while in the presence of the attending physician.:       I,:   personally performed the services described in this documentation    as scribed in my presence.:                   [1]   Past Medical History:  Diagnosis Date    High cholesterol     Hypertension    [2]   Past Surgical History:  Procedure Laterality Date    CARDIAC SURGERY N/A 02/2024   [3] No family history on file.  [4]   Social History  Tobacco Use    Smoking status: Never    Smokeless " tobacco: Never   Vaping Use    Vaping status: Never Used   Substance Use Topics    Alcohol use: Yes     Comment: social    Drug use: Never   [5]   Current Outpatient Medications:     amLODIPine (NORVASC) 5 mg tablet, Take 1 tablet by mouth in the morning, Disp: , Rfl:     ascorbic acid (VITAMIN C) 500 MG tablet, Take 1 tablet (500 mg total) by mouth 2 (two) times a day, Disp: 60 tablet, Rfl: 1    aspirin 81 mg chewable tablet, Chew 81 mg in the morning., Disp: , Rfl:     atorvastatin (LIPITOR) 20 mg tablet, take 1 tablet by mouth once daily, Disp: 90 tablet, Rfl: 1    ferrous sulfate 324 (65 Fe) mg, Take 1 tablet (324 mg total) by mouth 2 (two) times a day before meals, Disp: 60 tablet, Rfl: 1    folic acid (FOLVITE) 1 mg tablet, Take 1 tablet (1 mg total) by mouth daily, Disp: 30 tablet, Rfl: 1    magnesium gluconate (MAGONATE) 500 mg tablet, Take 1 tablet (500 mg total) by mouth 2 (two) times a day, Disp: 60 tablet, Rfl: 3    metFORMIN (GLUCOPHAGE) 500 mg tablet, take 1 tablet by mouth twice a day with meals, Disp: 60 tablet, Rfl: 5    metoprolol succinate (TOPROL-XL) 50 mg 24 hr tablet, , Disp: , Rfl:     Multiple Vitamins-Minerals (multivitamin with minerals) tablet, Take 1 tablet by mouth daily, Disp: 30 tablet, Rfl: 1    mupirocin (BACTROBAN) 2 % ointment, Apply topically to the inside of the left and right nostrils twice daily for 5 days before surgery, including the morning of surgery., Disp: 15 g, Rfl: 1    omeprazole (PriLOSEC OTC) 20 MG tablet, Take 20 mg by mouth in the morning., Disp: , Rfl:     rosuvastatin (CRESTOR) 40 MG tablet, Take 40 mg by mouth, Disp: , Rfl:     allopurinol (ZYLOPRIM) 300 mg tablet, Take 300 mg by mouth daily (Patient not taking: Reported on 6/3/2025), Disp: , Rfl:     vitamin B-12 (VITAMIN B-12) 1,000 mcg tablet, Take 1 tablet (1,000 mcg total) by mouth daily (Patient not taking: Reported on 11/25/2024), Disp: 90 tablet, Rfl: 3  [6] No Known Allergies

## 2025-06-11 ENCOUNTER — TELEPHONE (OUTPATIENT)
Dept: FAMILY MEDICINE CLINIC | Facility: CLINIC | Age: 65
End: 2025-06-11

## 2025-08-04 ENCOUNTER — VBI (OUTPATIENT)
Dept: ADMINISTRATIVE | Facility: OTHER | Age: 65
End: 2025-08-04

## 2025-08-05 ENCOUNTER — TELEPHONE (OUTPATIENT)
Dept: OBGYN CLINIC | Facility: HOSPITAL | Age: 65
End: 2025-08-05

## 2025-08-07 LAB
DME PARACHUTE DELIVERY DATE ACTUAL: NORMAL
DME PARACHUTE DELIVERY DATE EXPECTED: NORMAL
DME PARACHUTE DELIVERY DATE REQUESTED: NORMAL
DME PARACHUTE ITEM DESCRIPTION: NORMAL
DME PARACHUTE ORDER STATUS: NORMAL
DME PARACHUTE SUPPLIER NAME: NORMAL
DME PARACHUTE SUPPLIER PHONE: NORMAL

## 2025-08-18 ENCOUNTER — CONSULT (OUTPATIENT)
Dept: FAMILY MEDICINE CLINIC | Facility: CLINIC | Age: 65
End: 2025-08-18
Payer: COMMERCIAL

## 2025-08-18 VITALS
HEART RATE: 70 BPM | BODY MASS INDEX: 26.34 KG/M2 | WEIGHT: 184 LBS | OXYGEN SATURATION: 95 % | SYSTOLIC BLOOD PRESSURE: 138 MMHG | HEIGHT: 70 IN | DIASTOLIC BLOOD PRESSURE: 70 MMHG

## 2025-08-18 DIAGNOSIS — M25.562 ARTHRALGIA OF LEFT KNEE: ICD-10-CM

## 2025-08-18 DIAGNOSIS — Z01.818 PRE-OP EVALUATION: Primary | ICD-10-CM

## 2025-08-18 DIAGNOSIS — E78.5 HYPERLIPIDEMIA, UNSPECIFIED HYPERLIPIDEMIA TYPE: ICD-10-CM

## 2025-08-18 DIAGNOSIS — I25.810 CORONARY ARTERY DISEASE INVOLVING CORONARY BYPASS GRAFT OF NATIVE HEART WITHOUT ANGINA PECTORIS: ICD-10-CM

## 2025-08-18 DIAGNOSIS — E11.9 TYPE 2 DIABETES MELLITUS WITHOUT COMPLICATION, WITHOUT LONG-TERM CURRENT USE OF INSULIN (HCC): ICD-10-CM

## 2025-08-18 PROCEDURE — 93000 ELECTROCARDIOGRAM COMPLETE: CPT | Performed by: STUDENT IN AN ORGANIZED HEALTH CARE EDUCATION/TRAINING PROGRAM

## 2025-08-18 PROCEDURE — 99214 OFFICE O/P EST MOD 30 MIN: CPT | Performed by: STUDENT IN AN ORGANIZED HEALTH CARE EDUCATION/TRAINING PROGRAM

## 2025-08-19 ENCOUNTER — APPOINTMENT (OUTPATIENT)
Dept: LAB | Facility: HOSPITAL | Age: 65
End: 2025-08-19
Payer: COMMERCIAL

## 2025-08-19 DIAGNOSIS — Z01.812 PRE-OPERATIVE LABORATORY EXAMINATION: ICD-10-CM

## 2025-08-19 DIAGNOSIS — Z01.818 PREOP EXAMINATION: ICD-10-CM

## 2025-08-19 LAB
ALBUMIN SERPL BCG-MCNC: 4.6 G/DL (ref 3.5–5)
ALP SERPL-CCNC: 69 U/L (ref 34–104)
ALT SERPL W P-5'-P-CCNC: 36 U/L (ref 7–52)
ANION GAP SERPL CALCULATED.3IONS-SCNC: 10 MMOL/L (ref 4–13)
APTT PPP: 26 SECONDS (ref 23–34)
AST SERPL W P-5'-P-CCNC: 26 U/L (ref 13–39)
BASOPHILS # BLD AUTO: 0.07 THOUSANDS/ÂΜL (ref 0–0.1)
BASOPHILS NFR BLD AUTO: 1 % (ref 0–1)
BILIRUB SERPL-MCNC: 0.44 MG/DL (ref 0.2–1)
BUN SERPL-MCNC: 9 MG/DL (ref 5–25)
CALCIUM SERPL-MCNC: 9.4 MG/DL (ref 8.4–10.2)
CHLORIDE SERPL-SCNC: 105 MMOL/L (ref 96–108)
CO2 SERPL-SCNC: 26 MMOL/L (ref 21–32)
CREAT SERPL-MCNC: 0.74 MG/DL (ref 0.6–1.3)
EOSINOPHIL # BLD AUTO: 0.29 THOUSAND/ÂΜL (ref 0–0.61)
EOSINOPHIL NFR BLD AUTO: 5 % (ref 0–6)
ERYTHROCYTE [DISTWIDTH] IN BLOOD BY AUTOMATED COUNT: 12.6 % (ref 11.6–15.1)
GFR SERPL CREATININE-BSD FRML MDRD: 97 ML/MIN/1.73SQ M
GLUCOSE P FAST SERPL-MCNC: 121 MG/DL (ref 65–99)
HCT VFR BLD AUTO: 44.1 % (ref 36.5–49.3)
HGB BLD-MCNC: 15.3 G/DL (ref 12–17)
IMM GRANULOCYTES # BLD AUTO: 0.02 THOUSAND/UL (ref 0–0.2)
IMM GRANULOCYTES NFR BLD AUTO: 0 % (ref 0–2)
INR PPP: 1.02 (ref 0.85–1.19)
LYMPHOCYTES # BLD AUTO: 1.75 THOUSANDS/ÂΜL (ref 0.6–4.47)
LYMPHOCYTES NFR BLD AUTO: 30 % (ref 14–44)
MCH RBC QN AUTO: 32.6 PG (ref 26.8–34.3)
MCHC RBC AUTO-ENTMCNC: 34.7 G/DL (ref 31.4–37.4)
MCV RBC AUTO: 94 FL (ref 82–98)
MONOCYTES # BLD AUTO: 0.43 THOUSAND/ÂΜL (ref 0.17–1.22)
MONOCYTES NFR BLD AUTO: 7 % (ref 4–12)
NEUTROPHILS # BLD AUTO: 3.25 THOUSANDS/ÂΜL (ref 1.85–7.62)
NEUTS SEG NFR BLD AUTO: 57 % (ref 43–75)
NRBC BLD AUTO-RTO: 0 /100 WBCS
PLATELET # BLD AUTO: 188 THOUSANDS/UL (ref 149–390)
PMV BLD AUTO: 10 FL (ref 8.9–12.7)
POTASSIUM SERPL-SCNC: 4.1 MMOL/L (ref 3.5–5.3)
PROT SERPL-MCNC: 7 G/DL (ref 6.4–8.4)
PROTHROMBIN TIME: 13.9 SECONDS (ref 12.3–15)
RBC # BLD AUTO: 4.69 MILLION/UL (ref 3.88–5.62)
SODIUM SERPL-SCNC: 141 MMOL/L (ref 135–147)
WBC # BLD AUTO: 5.81 THOUSAND/UL (ref 4.31–10.16)

## 2025-08-19 PROCEDURE — 85730 THROMBOPLASTIN TIME PARTIAL: CPT

## 2025-08-19 PROCEDURE — 87081 CULTURE SCREEN ONLY: CPT

## 2025-08-19 PROCEDURE — 85610 PROTHROMBIN TIME: CPT

## 2025-08-19 PROCEDURE — 80053 COMPREHEN METABOLIC PANEL: CPT

## 2025-08-19 PROCEDURE — 85025 COMPLETE CBC W/AUTO DIFF WBC: CPT

## 2025-08-19 PROCEDURE — 36415 COLL VENOUS BLD VENIPUNCTURE: CPT

## 2025-08-20 ENCOUNTER — PREP FOR PROCEDURE (OUTPATIENT)
Dept: OBGYN CLINIC | Facility: HOSPITAL | Age: 65
End: 2025-08-20

## 2025-08-20 LAB — MRSA NOSE QL CULT: NORMAL
